# Patient Record
Sex: MALE | Race: WHITE | NOT HISPANIC OR LATINO | Employment: FULL TIME | URBAN - METROPOLITAN AREA
[De-identification: names, ages, dates, MRNs, and addresses within clinical notes are randomized per-mention and may not be internally consistent; named-entity substitution may affect disease eponyms.]

---

## 2017-10-04 ENCOUNTER — OFFICE VISIT - HEALTHEAST (OUTPATIENT)
Dept: FAMILY MEDICINE | Facility: CLINIC | Age: 47
End: 2017-10-04

## 2017-10-04 DIAGNOSIS — Z00.00 PHYSICAL EXAM: ICD-10-CM

## 2017-10-04 DIAGNOSIS — Z80.42 FAMILY HISTORY OF PROSTATE CANCER: ICD-10-CM

## 2017-10-04 ASSESSMENT — MIFFLIN-ST. JEOR: SCORE: 1557.52

## 2017-10-05 LAB
CHOLEST SERPL-MCNC: 217 MG/DL
FASTING STATUS PATIENT QL REPORTED: YES
HDLC SERPL-MCNC: 83 MG/DL
LDLC SERPL CALC-MCNC: 123 MG/DL
PSA SERPL-MCNC: 1 NG/ML (ref 0–2.5)
TRIGL SERPL-MCNC: 56 MG/DL

## 2017-10-16 ENCOUNTER — COMMUNICATION - HEALTHEAST (OUTPATIENT)
Dept: FAMILY MEDICINE | Facility: CLINIC | Age: 47
End: 2017-10-16

## 2018-12-12 ENCOUNTER — OFFICE VISIT - HEALTHEAST (OUTPATIENT)
Dept: FAMILY MEDICINE | Facility: CLINIC | Age: 48
End: 2018-12-12

## 2018-12-12 DIAGNOSIS — Z80.42 FAMILY HISTORY OF PROSTATE CANCER: ICD-10-CM

## 2018-12-12 DIAGNOSIS — Z00.00 ENCOUNTER FOR ROUTINE ADULT HEALTH EXAMINATION WITHOUT ABNORMAL FINDINGS: ICD-10-CM

## 2018-12-12 DIAGNOSIS — Z23 NEED FOR IMMUNIZATION AGAINST INFLUENZA: ICD-10-CM

## 2018-12-12 DIAGNOSIS — Z00.00 HEALTHCARE MAINTENANCE: ICD-10-CM

## 2018-12-12 LAB
ALBUMIN SERPL-MCNC: 4 G/DL (ref 3.5–5)
ALP SERPL-CCNC: 57 U/L (ref 45–120)
ALT SERPL W P-5'-P-CCNC: 37 U/L (ref 0–45)
ANION GAP SERPL CALCULATED.3IONS-SCNC: 11 MMOL/L (ref 5–18)
AST SERPL W P-5'-P-CCNC: 26 U/L (ref 0–40)
BILIRUB SERPL-MCNC: 1.3 MG/DL (ref 0–1)
BUN SERPL-MCNC: 16 MG/DL (ref 8–22)
CALCIUM SERPL-MCNC: 10.3 MG/DL (ref 8.5–10.5)
CHLORIDE BLD-SCNC: 103 MMOL/L (ref 98–107)
CHOLEST SERPL-MCNC: 220 MG/DL
CO2 SERPL-SCNC: 25 MMOL/L (ref 22–31)
CREAT SERPL-MCNC: 1.11 MG/DL (ref 0.7–1.3)
ERYTHROCYTE [DISTWIDTH] IN BLOOD BY AUTOMATED COUNT: 11 % (ref 11–14.5)
FASTING STATUS PATIENT QL REPORTED: YES
GFR SERPL CREATININE-BSD FRML MDRD: >60 ML/MIN/1.73M2
GLUCOSE BLD-MCNC: 83 MG/DL (ref 70–125)
HCT VFR BLD AUTO: 41.9 % (ref 40–54)
HDLC SERPL-MCNC: 70 MG/DL
HGB BLD-MCNC: 14.4 G/DL (ref 14–18)
LDLC SERPL CALC-MCNC: 137 MG/DL
MCH RBC QN AUTO: 32 PG (ref 27–34)
MCHC RBC AUTO-ENTMCNC: 34.3 G/DL (ref 32–36)
MCV RBC AUTO: 93 FL (ref 80–100)
PLATELET # BLD AUTO: 364 THOU/UL (ref 140–440)
PMV BLD AUTO: 7.4 FL (ref 7–10)
POTASSIUM BLD-SCNC: 4.4 MMOL/L (ref 3.5–5)
PROT SERPL-MCNC: 7.4 G/DL (ref 6–8)
PSA SERPL-MCNC: 0.8 NG/ML (ref 0–2.5)
RBC # BLD AUTO: 4.5 MILL/UL (ref 4.4–6.2)
SODIUM SERPL-SCNC: 139 MMOL/L (ref 136–145)
TRIGL SERPL-MCNC: 65 MG/DL
WBC: 5.7 THOU/UL (ref 4–11)

## 2018-12-12 ASSESSMENT — MIFFLIN-ST. JEOR: SCORE: 1537.64

## 2018-12-13 LAB — 25(OH)D3 SERPL-MCNC: 24.1 NG/ML (ref 30–80)

## 2018-12-17 ENCOUNTER — COMMUNICATION - HEALTHEAST (OUTPATIENT)
Dept: FAMILY MEDICINE | Facility: CLINIC | Age: 48
End: 2018-12-17

## 2019-02-19 ENCOUNTER — COMMUNICATION - HEALTHEAST (OUTPATIENT)
Dept: FAMILY MEDICINE | Facility: CLINIC | Age: 49
End: 2019-02-19

## 2020-02-06 ENCOUNTER — COMMUNICATION - HEALTHEAST (OUTPATIENT)
Dept: SCHEDULING | Facility: CLINIC | Age: 50
End: 2020-02-06

## 2020-02-07 ENCOUNTER — OFFICE VISIT - HEALTHEAST (OUTPATIENT)
Dept: FAMILY MEDICINE | Facility: CLINIC | Age: 50
End: 2020-02-07

## 2020-02-07 DIAGNOSIS — J01.10 ACUTE NON-RECURRENT FRONTAL SINUSITIS: ICD-10-CM

## 2020-02-07 DIAGNOSIS — Z12.11 SCREEN FOR COLON CANCER: ICD-10-CM

## 2020-02-07 ASSESSMENT — MIFFLIN-ST. JEOR: SCORE: 1565.06

## 2021-05-31 VITALS — HEIGHT: 68 IN | BODY MASS INDEX: 24.59 KG/M2 | WEIGHT: 162.25 LBS

## 2021-06-02 VITALS — HEIGHT: 67 IN | BODY MASS INDEX: 25.11 KG/M2 | WEIGHT: 160 LBS

## 2021-06-04 VITALS
SYSTOLIC BLOOD PRESSURE: 122 MMHG | OXYGEN SATURATION: 98 % | TEMPERATURE: 97.9 F | BODY MASS INDEX: 23.92 KG/M2 | DIASTOLIC BLOOD PRESSURE: 72 MMHG | WEIGHT: 161.5 LBS | HEIGHT: 69 IN | HEART RATE: 77 BPM

## 2021-06-05 NOTE — PROGRESS NOTES
"  Chief Complaint   Patient presents with     Facial Pain     Onset 3-4 weeks ago     Ear Problem     \"plugged\"     Laryngitis     lost voice yesterday         HPI:   Edgar Dunbar is a 49 y.o. male has had facial pressure and pain with ear pressure for the lat 3-4 weeks.  Lots of head congestion with post nasal drainage.  Lost voice yesterday.  No fever.  Sore throat with coughing.  Cough is mild productive.  No .osb.  Fatigued.  No chest pain.  No stomach pain.  No rash.  No dysuria.  No real headache.  No toothache.    No medication tried.    No tobacco.    ROS:  A 10 point comprehensive review of systems was negative except as noted.     Medications:  No current outpatient medications on file prior to visit.     No current facility-administered medications on file prior to visit.          Social History:  Social History     Tobacco Use     Smoking status: Never Smoker     Smokeless tobacco: Never Used     Tobacco comment: Partner smokes outside   Substance Use Topics     Alcohol use: Yes     Comment: 20drinks/wk         Physical Exam:   Vitals:    02/07/20 0945   BP: 122/72   Patient Site: Right Arm   Patient Position: Sitting   Cuff Size: Adult Regular   Pulse: 77   Temp: 97.9  F (36.6  C)   TempSrc: Oral   SpO2: 98%   Weight: 161 lb 8 oz (73.3 kg)   Height: 5' 8.5\" (1.74 m)       GENERAL:   Alert. Oriented.  EYES: Clear  HENT:  Ears: R TM pearly gray. Normal landmarks. L TM pearly gray.  Normal landmarks  Nose: Clear.  Sinuses: Mild bilateral frontal sinus tenderness  Oropharynx:  No erythema. No exudate.  NECK: Supple. No adenopathy.  LUNGS: Clear to ascultation.  No crackles.  No wheezing  HEART: RRR  SKIN:  No rash.         Assessment/Plan:    1. Acute non-recurrent frontal sinusitis  amoxicillin-clavulanate (AUGMENTIN) 500-125 mg per tablet   2. Screen for colon cancer  Ambulatory referral for Colonoscopy      Antibiotic as directed.  Warm moist compresses to face.  Tylenol or ibuprofen as needed for " pain or fever.  Sudafed as needed for congestion.  Afrin nasal spray as needed for congestion, no longer than 3 days.  Saline nasal spray.  Delsym as needed for cough.  Mucinex as needed to thin secretions.  F/U if worsening or not improving.            Ramirez Castellon MD      2/7/2020    The following portions of the patient's history were reviewed and updated as appropriate: allergies, current medications, past family history, past medical history, past social history, past surgical history and problem list.

## 2021-06-05 NOTE — PATIENT INSTRUCTIONS - HE
Antibiotic as directed.  Warm moist compresses to face.  Tylenol or ibuprofen as needed for pain or fever.  Sudafed as needed for congestion.  Afrin nasal spray as needed for congestion, no longer than 3 days.  Saline nasal spray.  Delsym as needed for cough.  Mucinex as needed to thin secretions.  F/U if worsening or not improving.

## 2021-06-05 NOTE — TELEPHONE ENCOUNTER
RN Triage  Cold 3-4 weeks sinus pressure cough  Loosing voice   plugged ears  Pain over eyes  No fever  Phlegm, no color  Fatigue  Warm transfer to  for appointment.     Marian Luna RN  [place] Nurse Advisor    Reason for Disposition    [1] Sinus congestion (pressure, fullness) AND [2] present > 10 days    Earache    Protocols used: SINUS PAIN OR CONGESTION-A-AH

## 2021-06-13 NOTE — PROGRESS NOTES
Assessment:      Healthy male exam.   Degenerative arthritis right knee  Baker's cyst right knee     Plan:       The patient will have routine lab work done today.  He will stay aerobically active.  He will follow a good well-balanced diet.  He wants to just watch and observe the knee pain.  He will take anti-inflammatories.  If pains worsen we will check an MRI scan.     Subjective:      Edgar Dunbar is a 47 y.o. male who presents for an annual exam. The patient reports that there is not domestic violence in his life.  Overall, he is feeling well.  He does get some pain in his right knee.  This comes and goes.  He does work on his feet all day long.  He is in and out of trucks all day.  This will make the knee pain worse.  He will ice the knee at the end of the day and that seems to help.    Healthy Habits:   Regular Exercise: Yes  Sunscreen Use: Yes  Healthy Diet: Yes  Dental Visits Regularly: Yes  Seat Belt: Yes  Sexually active: Yes  Monthly Self Testicular Exams:  No  Hemoccults: No  Flex Sig: No  Colonoscopy: No  Lipid Profile: Yes  Glucose Screen: Yes      Immunization History   Administered Date(s) Administered     DT (pediatric) 08/04/2005     Hep A, historic 10/12/2010, 04/05/2012     Td, historic 08/04/2005     Tdap 12/18/2013     Immunization status: up to date and documented.    No exam data present     No current outpatient prescriptions on file.     No current facility-administered medications for this visit.      No past medical history on file.  No past surgical history on file.  Review of patient's allergies indicates no known allergies.  No family history on file.  Social History     Social History     Marital status: Single     Spouse name: N/A     Number of children: N/A     Years of education: N/A     Occupational History     Not on file.     Social History Main Topics     Smoking status: Never Smoker     Smokeless tobacco: Never Used     Alcohol use Yes      Comment: 20drinks/wk     Drug  "use: No     Sexual activity: Yes     Partners: Female     Other Topics Concern     Not on file     Social History Narrative       Review of Systems  Review of Systems   Constitutional: Negative.  Negative for fatigue and fever.   HENT: Negative.    Eyes: Negative.    Respiratory: Negative.    Cardiovascular: Negative.    Gastrointestinal: Negative.  Negative for constipation and diarrhea.   Endocrine: Negative.    Genitourinary: Negative.    Musculoskeletal:        Complains of right knee pain.   Skin: Negative.    Allergic/Immunologic: Negative.    Neurological: Negative.    Hematological: Negative.    Psychiatric/Behavioral: Negative.              Objective:     Vitals:    10/04/17 1631   BP: 112/70   Pulse: 64   Resp: 16   Temp: 97.9  F (36.6  C)   TempSrc: Oral   Weight: 162 lb 4 oz (73.6 kg)   Height: 5' 7.82\" (1.723 m)     Body mass index is 24.8 kg/(m^2).    Physical  Physical Exam   Constitutional: He is oriented to person, place, and time. He appears well-developed and well-nourished. No distress.   HENT:   Right Ear: External ear normal.   Left Ear: External ear normal.   Mouth/Throat: Oropharynx is clear and moist.   Eyes: Conjunctivae and EOM are normal. Pupils are equal, round, and reactive to light.   Neck: Normal range of motion. Neck supple. No thyromegaly present.   Cardiovascular: Normal rate, regular rhythm and normal heart sounds.    No murmur heard.  Pulmonary/Chest: Effort normal and breath sounds normal. No respiratory distress.   Abdominal: Soft. Bowel sounds are normal. He exhibits no mass. There is no tenderness.   Genitourinary: Penis normal.   Genitourinary Comments: No inguinal hernia present.   Musculoskeletal: Normal range of motion. He exhibits no edema.   There is mild tenderness over the medial joint line of the right knee.  Small effusion is present.  Baker's cyst is present posteriorly.   Neurological: He is alert and oriented to person, place, and time. He has normal reflexes. "   Skin: Skin is warm and dry.   There are seborrheic keratoses noted over the back.  There is a single nevus which is dark in color but uniform in color.  It is very well-defined.  It is flat.  No malignant features noted in this.   Psychiatric: He has a normal mood and affect.

## 2021-06-16 PROBLEM — E78.5 HYPERLIPIDEMIA LDL GOAL <130: Status: ACTIVE | Noted: 2018-12-17

## 2021-06-16 PROBLEM — E55.9 VITAMIN D DEFICIENCY: Status: ACTIVE | Noted: 2018-12-17

## 2021-06-18 NOTE — LETTER
Letter by Reynaldo Roa CMA at      Author: Reynaldo Roa CMA Service: -- Author Type: --    Filed:  Encounter Date: 2/19/2019 Status: (Other)       March 5, 2019    Edgar Dunbar  [address]  [place] [address]      Dear [place],    [facility] staff was able to verify that you have not yet established care with a new healthcare provider.     As a reminder, Dr. Feliz has recently retired from the clinic.     Please contact the [facility], and a member of our clinical staff would be happy to assist you with scheduling an appointment to set up care with a new physician.     Please call [phone], and ask to schedule an establish care appointment with a new provider.     Thank you!

## 2021-06-22 NOTE — PROGRESS NOTES
Assessment:      Healthy male exam.   External hemorrhoid  Sebaceous cyst     Plan:       Routine lab work will be done today.  Patient will be called with abnormalities.  Influenza vaccination will also be updated.  He will try to become more aerobically active outside of his work.  He will continue a good well-balanced diet.  He thinks he follows a good low-cholesterol diet at this point in time.  He does not eat many fast foods.  He is up-to-date on all preventive health screenings.     Subjective:      Edgar Dunbar is a 48 y.o. male who presents for an annual exam. The patient reports that there is not domestic violence in his life.  Overall, he is feeling well.  He awoke this morning with a small area of pain in the rectal area.  This area burned upon wiping after a bowel movement.  He denies blood in the stool.  He denies abdominal pain.  He continues to work full-time.  He works as a .  He stays fairly active with his job but does not have a true exercise program.  He thinks he follows a good diet.  Last year he was able to lower his cholesterol 50 points with diet only.  He does have a family history of prostate cancer.  He would like a PSA drawn today.    Healthy Habits:   Regular Exercise: Yes  Sunscreen Use: Yes  Healthy Diet: Yes  Dental Visits Regularly: Yes  Seat Belt: Yes  Sexually active: Yes  Monthly Self Testicular Exams:  No  Hemoccults: No  Flex Sig: No  Colonoscopy: No  Lipid Profile: Yes  Glucose Screen: Yes      Immunization History   Administered Date(s) Administered     DT (pediatric) 08/04/2005     Hep A, historic 10/12/2010, 04/05/2012     Td,adult,historic,unspecified 08/04/2005     Tdap 12/18/2013     Immunization status: up to date and documented.    No exam data present     No current outpatient medications on file.     No current facility-administered medications for this visit.      No past medical history on file.  No past surgical history on file.  Patient has no  "known allergies.  No family history on file.  Social History     Socioeconomic History     Marital status: Single     Spouse name: Not on file     Number of children: Not on file     Years of education: Not on file     Highest education level: Not on file   Social Needs     Financial resource strain: Not on file     Food insecurity - worry: Not on file     Food insecurity - inability: Not on file     Transportation needs - medical: Not on file     Transportation needs - non-medical: Not on file   Occupational History     Not on file   Tobacco Use     Smoking status: Never Smoker     Smokeless tobacco: Never Used   Substance and Sexual Activity     Alcohol use: Yes     Comment: 20drinks/wk     Drug use: No     Sexual activity: Yes     Partners: Female   Other Topics Concern     Not on file   Social History Narrative     Not on file       Review of Systems  Review of Systems   Constitutional: Negative.  Negative for fatigue and fever.   HENT: Negative.    Eyes: Negative.    Respiratory: Negative.    Cardiovascular: Negative.    Gastrointestinal:        Complains of mild rectal pain.  This just started today.   Endocrine: Negative.    Genitourinary: Negative.    Musculoskeletal: Negative.    Skin: Negative.    Allergic/Immunologic: Negative.    Neurological: Negative.    Hematological: Negative.    Psychiatric/Behavioral: Negative.              Objective:     Vitals:    12/12/18 1623   BP: 120/70   Pulse: 76   Resp: 14   Temp: 97.6  F (36.4  C)   TempSrc: Oral   SpO2: 98%   Weight: 160 lb (72.6 kg)   Height: 5' 7.21\" (1.707 m)     Body mass index is 24.91 kg/m .    Physical  Physical Exam   Constitutional: He is oriented to person, place, and time. He appears well-developed and well-nourished. No distress.   HENT:   Right Ear: External ear normal.   Left Ear: External ear normal.   Mouth/Throat: Oropharynx is clear and moist.   Eyes: Conjunctivae and EOM are normal. Pupils are equal, round, and reactive to light. "   Neck: Normal range of motion. Neck supple. No JVD present. No thyromegaly present.   Cardiovascular: Normal rate, regular rhythm and normal heart sounds.   No murmur heard.  Pulmonary/Chest: Effort normal and breath sounds normal. No respiratory distress.   Abdominal: Soft. Bowel sounds are normal. He exhibits no mass. There is no tenderness.   Genitourinary: Prostate normal and penis normal.   Genitourinary Comments: No inguinal hernia noted.  Small external hemorrhoid noted in rectal area.   Musculoskeletal: Normal range of motion. He exhibits no edema or tenderness.   Lymphadenopathy:     He has no cervical adenopathy.   Neurological: He is alert and oriented to person, place, and time. He has normal reflexes. No cranial nerve deficit.   Skin: Skin is warm and dry. No rash noted.   There is a 3 x 3 cm sebaceous cyst noted over the right subscapular area.  This area is nontender.   Psychiatric: He has a normal mood and affect.

## 2021-06-24 NOTE — TELEPHONE ENCOUNTER
CMT left message x 1 for patient to return call. Please review message thread below and advise the patient when they return our clinical call. Please schedule if indicated in message. [facility] will attempted to reach the patient x 3 per clinical protocol before closing the encounter. [facility] will send letter to prompt patient follow-up.

## 2021-06-24 NOTE — TELEPHONE ENCOUNTER
CMT left message x 2. Please review message thread below and advise the patient as indicated. Please schedule if necessary or indicated in message thread.  CMT will attempt to reach the patient x 3 per clinical protocol before sending a letter to prompt patient follow up.

## 2021-06-24 NOTE — TELEPHONE ENCOUNTER
The patient is due to establish care with a new primary care physician. [facility] intends to contact the patient and offer an appointment with a provider at [place]. If the patient has already established care elsewhere, staff should remove [facility] from the PCP listing.

## 2021-06-26 ENCOUNTER — HEALTH MAINTENANCE LETTER (OUTPATIENT)
Age: 51
End: 2021-06-26

## 2021-10-16 ENCOUNTER — HEALTH MAINTENANCE LETTER (OUTPATIENT)
Age: 51
End: 2021-10-16

## 2022-07-17 ENCOUNTER — HEALTH MAINTENANCE LETTER (OUTPATIENT)
Age: 52
End: 2022-07-17

## 2022-09-25 ENCOUNTER — HEALTH MAINTENANCE LETTER (OUTPATIENT)
Age: 52
End: 2022-09-25

## 2023-08-05 ENCOUNTER — HEALTH MAINTENANCE LETTER (OUTPATIENT)
Age: 53
End: 2023-08-05

## 2023-10-02 ENCOUNTER — OFFICE VISIT (OUTPATIENT)
Dept: FAMILY MEDICINE | Facility: CLINIC | Age: 53
End: 2023-10-02
Payer: COMMERCIAL

## 2023-10-02 VITALS
DIASTOLIC BLOOD PRESSURE: 83 MMHG | TEMPERATURE: 97.8 F | WEIGHT: 158.6 LBS | HEIGHT: 68 IN | HEART RATE: 83 BPM | BODY MASS INDEX: 24.04 KG/M2 | RESPIRATION RATE: 11 BRPM | SYSTOLIC BLOOD PRESSURE: 134 MMHG | OXYGEN SATURATION: 98 %

## 2023-10-02 DIAGNOSIS — Z11.59 NEED FOR HEPATITIS C SCREENING TEST: ICD-10-CM

## 2023-10-02 DIAGNOSIS — J34.2 DEVIATED NASAL SEPTUM: Primary | ICD-10-CM

## 2023-10-02 DIAGNOSIS — F41.1 GAD (GENERALIZED ANXIETY DISORDER): ICD-10-CM

## 2023-10-02 DIAGNOSIS — Z12.5 SCREENING FOR PROSTATE CANCER: ICD-10-CM

## 2023-10-02 DIAGNOSIS — F41.0 PANIC ATTACK: ICD-10-CM

## 2023-10-02 DIAGNOSIS — Z12.11 SCREEN FOR COLON CANCER: ICD-10-CM

## 2023-10-02 DIAGNOSIS — D17.30 LIPOMA OF SKIN AND SUBCUTANEOUS TISSUE: ICD-10-CM

## 2023-10-02 DIAGNOSIS — Z11.4 SCREENING FOR HIV (HUMAN IMMUNODEFICIENCY VIRUS): ICD-10-CM

## 2023-10-02 DIAGNOSIS — Z00.00 ANNUAL PHYSICAL EXAM: ICD-10-CM

## 2023-10-02 PROBLEM — E55.9 VITAMIN D DEFICIENCY: Status: RESOLVED | Noted: 2018-12-17 | Resolved: 2023-10-02

## 2023-10-02 PROBLEM — E78.5 HYPERLIPIDEMIA LDL GOAL <130: Status: RESOLVED | Noted: 2018-12-17 | Resolved: 2023-10-02

## 2023-10-02 LAB
ALBUMIN SERPL BCG-MCNC: 4.6 G/DL (ref 3.5–5.2)
ALP SERPL-CCNC: 59 U/L (ref 40–129)
ALT SERPL W P-5'-P-CCNC: 46 U/L (ref 0–70)
ANION GAP SERPL CALCULATED.3IONS-SCNC: 11 MMOL/L (ref 7–15)
AST SERPL W P-5'-P-CCNC: 44 U/L (ref 0–45)
BILIRUB SERPL-MCNC: 0.6 MG/DL
BUN SERPL-MCNC: 14.2 MG/DL (ref 6–20)
CALCIUM SERPL-MCNC: 9.9 MG/DL (ref 8.6–10)
CHLORIDE SERPL-SCNC: 103 MMOL/L (ref 98–107)
CHOLEST SERPL-MCNC: 238 MG/DL
CREAT SERPL-MCNC: 1.05 MG/DL (ref 0.67–1.17)
DEPRECATED HCO3 PLAS-SCNC: 25 MMOL/L (ref 22–29)
EGFRCR SERPLBLD CKD-EPI 2021: 84 ML/MIN/1.73M2
ERYTHROCYTE [DISTWIDTH] IN BLOOD BY AUTOMATED COUNT: 12.3 % (ref 10–15)
GLUCOSE SERPL-MCNC: 114 MG/DL (ref 70–99)
HCT VFR BLD AUTO: 42.5 % (ref 40–53)
HCV AB SERPL QL IA: NONREACTIVE
HDLC SERPL-MCNC: 82 MG/DL
HGB BLD-MCNC: 14.6 G/DL (ref 13.3–17.7)
HIV 1+2 AB+HIV1 P24 AG SERPL QL IA: NONREACTIVE
LDLC SERPL CALC-MCNC: 150 MG/DL
MCH RBC QN AUTO: 31.9 PG (ref 26.5–33)
MCHC RBC AUTO-ENTMCNC: 34.4 G/DL (ref 31.5–36.5)
MCV RBC AUTO: 93 FL (ref 78–100)
NONHDLC SERPL-MCNC: 156 MG/DL
PLATELET # BLD AUTO: 363 10E3/UL (ref 150–450)
POTASSIUM SERPL-SCNC: 5.1 MMOL/L (ref 3.4–5.3)
PROT SERPL-MCNC: 8.1 G/DL (ref 6.4–8.3)
PSA SERPL DL<=0.01 NG/ML-MCNC: 1.46 NG/ML (ref 0–3.5)
RBC # BLD AUTO: 4.57 10E6/UL (ref 4.4–5.9)
SODIUM SERPL-SCNC: 139 MMOL/L (ref 135–145)
TRIGL SERPL-MCNC: 29 MG/DL
WBC # BLD AUTO: 6.3 10E3/UL (ref 4–11)

## 2023-10-02 PROCEDURE — 36415 COLL VENOUS BLD VENIPUNCTURE: CPT | Performed by: STUDENT IN AN ORGANIZED HEALTH CARE EDUCATION/TRAINING PROGRAM

## 2023-10-02 PROCEDURE — 80053 COMPREHEN METABOLIC PANEL: CPT | Performed by: STUDENT IN AN ORGANIZED HEALTH CARE EDUCATION/TRAINING PROGRAM

## 2023-10-02 PROCEDURE — 85027 COMPLETE CBC AUTOMATED: CPT | Performed by: STUDENT IN AN ORGANIZED HEALTH CARE EDUCATION/TRAINING PROGRAM

## 2023-10-02 PROCEDURE — 86803 HEPATITIS C AB TEST: CPT | Performed by: STUDENT IN AN ORGANIZED HEALTH CARE EDUCATION/TRAINING PROGRAM

## 2023-10-02 PROCEDURE — 90471 IMMUNIZATION ADMIN: CPT | Performed by: STUDENT IN AN ORGANIZED HEALTH CARE EDUCATION/TRAINING PROGRAM

## 2023-10-02 PROCEDURE — 80061 LIPID PANEL: CPT | Performed by: STUDENT IN AN ORGANIZED HEALTH CARE EDUCATION/TRAINING PROGRAM

## 2023-10-02 PROCEDURE — 99386 PREV VISIT NEW AGE 40-64: CPT | Mod: 25 | Performed by: STUDENT IN AN ORGANIZED HEALTH CARE EDUCATION/TRAINING PROGRAM

## 2023-10-02 PROCEDURE — 87389 HIV-1 AG W/HIV-1&-2 AB AG IA: CPT | Performed by: STUDENT IN AN ORGANIZED HEALTH CARE EDUCATION/TRAINING PROGRAM

## 2023-10-02 PROCEDURE — 99214 OFFICE O/P EST MOD 30 MIN: CPT | Mod: 25 | Performed by: STUDENT IN AN ORGANIZED HEALTH CARE EDUCATION/TRAINING PROGRAM

## 2023-10-02 PROCEDURE — G0103 PSA SCREENING: HCPCS | Performed by: STUDENT IN AN ORGANIZED HEALTH CARE EDUCATION/TRAINING PROGRAM

## 2023-10-02 PROCEDURE — 90682 RIV4 VACC RECOMBINANT DNA IM: CPT | Performed by: STUDENT IN AN ORGANIZED HEALTH CARE EDUCATION/TRAINING PROGRAM

## 2023-10-02 RX ORDER — CITALOPRAM HYDROBROMIDE 10 MG/1
10 TABLET ORAL DAILY
Qty: 30 TABLET | Refills: 1 | Status: SHIPPED | OUTPATIENT
Start: 2023-10-02 | End: 2023-12-11

## 2023-10-02 ASSESSMENT — ENCOUNTER SYMPTOMS
ABDOMINAL PAIN: 0
HEMATOCHEZIA: 0
CHILLS: 0
WEAKNESS: 0
NAUSEA: 0
EYE PAIN: 0
FREQUENCY: 0
ARTHRALGIAS: 0
CONSTIPATION: 0
NERVOUS/ANXIOUS: 1
HEADACHES: 0
HEARTBURN: 0
JOINT SWELLING: 0
PARESTHESIAS: 0
DYSURIA: 0
DIZZINESS: 0
MYALGIAS: 0
COUGH: 0
DIARRHEA: 0
SHORTNESS OF BREATH: 0
HEMATURIA: 0
PALPITATIONS: 0
SORE THROAT: 0

## 2023-10-02 ASSESSMENT — PATIENT HEALTH QUESTIONNAIRE - PHQ9
SUM OF ALL RESPONSES TO PHQ QUESTIONS 1-9: 2
5. POOR APPETITE OR OVEREATING: SEVERAL DAYS

## 2023-10-02 ASSESSMENT — ANXIETY QUESTIONNAIRES
2. NOT BEING ABLE TO STOP OR CONTROL WORRYING: SEVERAL DAYS
5. BEING SO RESTLESS THAT IT IS HARD TO SIT STILL: NOT AT ALL
IF YOU CHECKED OFF ANY PROBLEMS ON THIS QUESTIONNAIRE, HOW DIFFICULT HAVE THESE PROBLEMS MADE IT FOR YOU TO DO YOUR WORK, TAKE CARE OF THINGS AT HOME, OR GET ALONG WITH OTHER PEOPLE: SOMEWHAT DIFFICULT
GAD7 TOTAL SCORE: 6
GAD7 TOTAL SCORE: 6
3. WORRYING TOO MUCH ABOUT DIFFERENT THINGS: SEVERAL DAYS
1. FEELING NERVOUS, ANXIOUS, OR ON EDGE: SEVERAL DAYS
6. BECOMING EASILY ANNOYED OR IRRITABLE: SEVERAL DAYS
7. FEELING AFRAID AS IF SOMETHING AWFUL MIGHT HAPPEN: SEVERAL DAYS

## 2023-10-02 ASSESSMENT — PAIN SCALES - GENERAL: PAINLEVEL: NO PAIN (0)

## 2023-10-02 NOTE — PROGRESS NOTES
Assessment/ Plan   53-year-old male with unremarkable past medical history who presents for establishment of care and annual physical exam.  Also wishes to discuss panic attacks he has been having while driving.  I do think his description of these are consistent with panic attacks with heart racing and tachypnea sometimes associated feeling of dread and numbness and tingling in his face and hands.  He has never tried medications before for anxiety but given his profession as a  cannot be on any potentially sedating medications.  This limits him somewhat so I recommended starting an SSRI daily.  We will follow-up in a month to see how this is going.    1. Deviated nasal septum  - Adult ENT  Referral; Future    2. SHARONDA (generalized anxiety disorder)  3. Panic attack  - citalopram (CELEXA) 10 MG tablet; Take 1 tablet (10 mg) by mouth daily  Dispense: 30 tablet; Refill: 1    4. Screening for prostate cancer  - PSA, screen; Future    5. Annual physical exam  - Lipid panel reflex to direct LDL Fasting; Future  - Comprehensive metabolic panel (BMP + Alb, Alk Phos, ALT, AST, Total. Bili, TP); Future  - CBC with platelets; Future    6. Lipoma of skin and subcutaneous tissue    7. Screening for HIV (human immunodeficiency virus)  - HIV Antigen Antibody Combo; Future    8. Need for hepatitis C screening test  - Hepatitis C antibody; Future    9. Screen for colon cancer  - Colonoscopy Screening  Referral; Future    Follow-up in: 1 month virtual for anxiety    Driss Peralta MD    Subjective:     Edgar Dunbar is a 53 year old male who presents for an annual exam.     Chief Complaint   Patient presents with    Physical     Anxiety - when driving - panic attack      Patient tells me he has been having some anxiety with driving.  When [place] is in [place] he will feel anxious about the cars around him and start getting overwhelmed.  He needs to get to the right leg to feel good.   He feels like these are panic attacks.  He does not have anxiety otherwise.         No data to display                Colonoscopy: never  PSA:  Prostate Specific Antigen Screen   Date Value Ref Range Status   12/12/2018 0.8 0.0 - 2.5 ng/mL Final       Immunization History   Administered Date(s) Administered    COVID-19 MONOVALENT 12+ (Pfizer) 04/26/2021, 05/17/2021    DT (PEDS <7y) 08/04/2005    FLU 6-35 months 10/21/2009    Flu, Unspecified 09/27/2011, 09/18/2019    Hepatitis A (ADULT 19+) 10/12/2010, 04/05/2012    Influenza (IIV3) PF 10/13/2010    Influenza Vaccine, 6+MO IM (QUADRIVALENT W/PRESERVATIVES) 12/12/2018    MMR 08/31/1994    TD,PF 7+ (Tenivac) 08/04/2005    TDAP (Adacel,Boostrix) 12/18/2013    Td (Adult), Adsorbed 08/31/1994    Td,adult,historic,unspecified 08/04/2005     Immunization status: due today.     No current outpatient medications on file.     No past medical history on file.  No past surgical history on file.  Patient has no known allergies.  No family history on file.  Social History     Socioeconomic History    Marital status: Single     Spouse name: Not on file    Number of children: Not on file    Years of education: Not on file    Highest education level: Not on file   Occupational History    Not on file   Tobacco Use    Smoking status: Never    Smokeless tobacco: Never    Tobacco comments:     Partner smokes outside   Substance and Sexual Activity    Alcohol use: Yes     Comment: Alcoholic Drinks/day: 20drinks/wk    Drug use: No    Sexual activity: Yes     Partners: Female   Other Topics Concern    Not on file   Social History Narrative    Not on file     Social Determinants of Health     Financial Resource Strain: Low Risk  (10/2/2023)    Financial Resource Strain     Within the past 12 months, have you or your family members you live with been unable to get utilities (heat, electricity) when it was really needed?: No   Food Insecurity: Low Risk  (10/2/2023)    Food Insecurity      "Within the past 12 months, did you worry that your food would run out before you got money to buy more?: No     Within the past 12 months, did the food you bought just not last and you didn t have money to get more?: No   Transportation Needs: Low Risk  (10/2/2023)    Transportation Needs     Within the past 12 months, has lack of transportation kept you from medical appointments, getting your medicines, non-medical meetings or appointments, work, or from getting things that you need?: No   Physical Activity: Not on file   Stress: Not on file   Social Connections: Not on file   Interpersonal Safety: Low Risk  (10/2/2023)    Interpersonal Safety     Do you feel physically and emotionally safe where you currently live?: Yes     Within the past 12 months, have you been hit, slapped, kicked or otherwise physically hurt by someone?: No     Within the past 12 months, have you been humiliated or emotionally abused in other ways by your partner or ex-partner?: No   Housing Stability: Low Risk  (10/2/2023)    Housing Stability     Do you have housing? : Yes     Are you worried about losing your housing?: No       Review of Systems  Complete ROS negative except as noted in the HPI    Objective:      Vitals:    10/02/23 1003 10/02/23 1008   BP: (!) 148/80 134/83   BP Location: Right arm Right arm   Patient Position: Sitting Sitting   Cuff Size: Adult Large Adult Large   Pulse: 83    Resp: 11    Temp: 97.8  F (36.6  C)    TempSrc: Temporal    SpO2: 98%    Weight: 71.9 kg (158 lb 9.6 oz)    Height: 1.729 m (5' 8.07\")        General appearance: Alert, cooperative, no distress, appears stated age  Head: Normocephalic, atraumatic, without obvious abnormality  EARS: TM's gray dull with structures seen bilaterally  Eyes: Pupils equal round, reactive.  Conjunctiva clear.  Nose: Nares normal, no drainage.  Throat: Lips, mucosa, tongue normal mucosa pink and moist  Neck: Supple, symmetric, trachea midline, no adenopathy.  No thyroid " enlargement, tenderness or nodules.    Lungs: Clear to auscultation bilaterally, no wheezing or crackles present.  Respirations unlabored  Heart: Regular rate and rhythm, normal S1 and S2, no murmur, rub or gallop.  Abdomen: Soft, nontender, nondistended.  Bowel sounds active in all 4 quadrants.  No masses or organomegaly.  Extremities: Extremities normal, atraumatic.  No cyanosis or edema.  Skin: Skin color, texture, turgor normal no rashes or lesions on limited skin exam  Neurologic: CN II through XII intact, normal strength.  Psych: Normal-appearing hygiene, speech is normal pace and volume, nontangential, noncircumferential, thoughtprocess is logical, does not appear to responding to internal stimuli, no expression of paranoid delusions, mood is mildly anxious      Driss Johnson MD

## 2023-10-03 NOTE — RESULT ENCOUNTER NOTE
Red,  Your results from your recent clinic visit show:  Your lipids look ok and I used these as well as other factors from your history to calculate your 10 year risk of having something like a heart attack (ASCVD risk) and it was low risk. Just continue to work on exercise and diet to maintain this low risk.    The 10-year ASCVD risk score (Cheryl BAL, et al., 2019) is: 4.3%    Values used to calculate the score:      Age: 53 years      Sex: Male      Is Non- : No      Diabetic: No      Tobacco smoker: No      Systolic Blood Pressure: 134 mmHg      Is BP treated: No      HDL Cholesterol: 82 mg/dL      Total Cholesterol: 238 mg/dL    Your CMP was normal with normal electrolytes, kidney function, and liver function  Your HIV is normal or negative  Your PSA, which is a screen for prostate cancer, was normal  Your Hep c screen is normal or negative  Your CBC is normal with no anemia or signs of infection seen    If you have more questions please call the clinic at [phone] or send me a [facility] message    Dr. Driss Peralta

## 2023-11-08 ASSESSMENT — ANXIETY QUESTIONNAIRES
7. FEELING AFRAID AS IF SOMETHING AWFUL MIGHT HAPPEN: NOT AT ALL
5. BEING SO RESTLESS THAT IT IS HARD TO SIT STILL: NOT AT ALL
GAD7 TOTAL SCORE: 0
2. NOT BEING ABLE TO STOP OR CONTROL WORRYING: NOT AT ALL
GAD7 TOTAL SCORE: 0
4. TROUBLE RELAXING: NOT AT ALL
6. BECOMING EASILY ANNOYED OR IRRITABLE: NOT AT ALL
3. WORRYING TOO MUCH ABOUT DIFFERENT THINGS: NOT AT ALL
1. FEELING NERVOUS, ANXIOUS, OR ON EDGE: NOT AT ALL

## 2023-11-09 ENCOUNTER — VIRTUAL VISIT (OUTPATIENT)
Dept: FAMILY MEDICINE | Facility: CLINIC | Age: 53
End: 2023-11-09
Payer: COMMERCIAL

## 2023-11-09 DIAGNOSIS — F41.1 GAD (GENERALIZED ANXIETY DISORDER): Primary | ICD-10-CM

## 2023-11-09 PROCEDURE — 99213 OFFICE O/P EST LOW 20 MIN: CPT | Mod: 95 | Performed by: STUDENT IN AN ORGANIZED HEALTH CARE EDUCATION/TRAINING PROGRAM

## 2023-11-09 PROCEDURE — 96127 BRIEF EMOTIONAL/BEHAV ASSMT: CPT | Performed by: STUDENT IN AN ORGANIZED HEALTH CARE EDUCATION/TRAINING PROGRAM

## 2023-11-09 RX ORDER — CITALOPRAM HYDROBROMIDE 10 MG/1
10 TABLET ORAL DAILY
Qty: 90 TABLET | Refills: 3 | Status: SHIPPED | OUTPATIENT
Start: 2023-11-09 | End: 2024-09-18

## 2023-11-09 NOTE — PROGRESS NOTES
Assessment & Plan   53-year-old male with recent diagnosis of generalized anxiety disorder and panic attacks.  We started patient on citalopram 10 mg a month ago.  Patient is doing well on this with no panic attacks since starting and significantly less baseline anxiety.  He would like to continue in the long-term and I agree.    1. SHARONDA (generalized anxiety disorder)  - citalopram (CELEXA) 10 MG tablet; Take 1 tablet (10 mg) by mouth daily  Dispense: 90 tablet; Refill: 3      Subjective   Chief Complaint   Patient presents with    Follow Up     Medication        HPI     Depression/SHARONDA HX:  Patient tells me he has been having some anxiety with driving.  [place] in [place] he will feel anxious about the cars around him and start getting overwhelmed.  He needs to get to the right leg to feel good.  He feels like these are panic attacks.  He does not have anxiety otherwise.     11/23:  Patient tells me that after a about 3 weeks he started noticing less panic attacks and hasn't had any.  Less baseline anxiety then he felt before. He had luciana nausea at first but this improved. No others.     Current Treatment:  Citalopram 10 mg daily        10/2/2023     2:32 PM   PHQ   PHQ-9 Total Score 2   Q9: Thoughts of better off dead/self-harm past 2 weeks Not at all           10/2/2023     2:32 PM 11/8/2023     6:26 PM   SHARONDA-7 SCORE   Total Score  0 (minimal anxiety)   Total Score 6 0     Objective      Vitals:  No vitals were obtained today due to virtual visit.    Physical Exam   GENERAL: Healthy, alert and no distress  EYES: Eyes grossly normal to inspection.  No discharge or erythema, or obvious scleral/conjunctival abnormalities.  RESP: No audible wheeze, cough, or visible cyanosis.  No visible retractions or increased work of breathing.    SKIN: Visible skin clear. No significant rash, abnormal pigmentation or lesions.  NEURO: Cranial nerves grossly intact.  Mentation and speech appropriate for  age.  PSYCH: Mentation appears normal, affect normal/bright, judgement and insight intact, normal speech and appearance well-groomed.    Video-Visit Details    Type of service:  Video Visit     Start time: 5:31 PM  End time: 5:38 PM    Originating Location (pt. Location): Home  Distant Location (provider location):  On-site  Platform used for Video Visit: Erma

## 2023-11-09 NOTE — TELEPHONE ENCOUNTER
FUTURE VISIT INFORMATION      FUTURE VISIT INFORMATION:  Date: 2/21/2024  Time: 12 PM  [place] [facility] [place]  REFERRAL INFORMATION:  Referring provider:    Referring providers clinic:    Reason for visit/diagnosis  Deviated nasal septum, apt per Pt, [place] verified     RECORDS REQUESTED FROM:       Clinic name Comments Records Status Imaging Status   [facility] Medicine 10/2/23 referral/ OV notes- Driss Johnson MD Epic    [facility]    2/7/2020 OV with Ramirez Castellon MD Epic

## 2023-11-28 DIAGNOSIS — F41.0 PANIC ATTACK: ICD-10-CM

## 2023-11-28 DIAGNOSIS — F41.1 GAD (GENERALIZED ANXIETY DISORDER): ICD-10-CM

## 2023-11-28 RX ORDER — CITALOPRAM HYDROBROMIDE 10 MG/1
10 TABLET ORAL DAILY
Qty: 30 TABLET | Refills: 1 | OUTPATIENT
Start: 2023-11-28

## 2023-11-28 NOTE — TELEPHONE ENCOUNTER
Pending Prescriptions:                       Disp   Refills    citalopram (CELEXA) 10 MG tablet          30 tab*1            Sig: Take 1 tablet (10 mg) by mouth daily

## 2023-12-11 DIAGNOSIS — F41.1 GAD (GENERALIZED ANXIETY DISORDER): ICD-10-CM

## 2023-12-11 DIAGNOSIS — F41.0 PANIC ATTACK: ICD-10-CM

## 2023-12-11 RX ORDER — CITALOPRAM HYDROBROMIDE 10 MG/1
10 TABLET ORAL DAILY
Qty: 90 TABLET | Refills: 1 | Status: SHIPPED | OUTPATIENT
Start: 2023-12-11 | End: 2024-04-24

## 2024-01-08 ENCOUNTER — TRANSFERRED RECORDS (OUTPATIENT)
Dept: HEALTH INFORMATION MANAGEMENT | Facility: CLINIC | Age: 54
End: 2024-01-08
Payer: COMMERCIAL

## 2024-01-12 PROBLEM — Z00.00 HEALTHCARE MAINTENANCE: Status: ACTIVE | Noted: 2023-10-03

## 2024-02-21 ENCOUNTER — OFFICE VISIT (OUTPATIENT)
Dept: OTOLARYNGOLOGY | Facility: CLINIC | Age: 54
End: 2024-02-21
Attending: STUDENT IN AN ORGANIZED HEALTH CARE EDUCATION/TRAINING PROGRAM
Payer: COMMERCIAL

## 2024-02-21 ENCOUNTER — TELEPHONE (OUTPATIENT)
Dept: OTOLARYNGOLOGY | Facility: CLINIC | Age: 54
End: 2024-02-21

## 2024-02-21 ENCOUNTER — PRE VISIT (OUTPATIENT)
Dept: OTOLARYNGOLOGY | Facility: CLINIC | Age: 54
End: 2024-02-21

## 2024-02-21 VITALS — HEIGHT: 68 IN | WEIGHT: 162 LBS | BODY MASS INDEX: 24.55 KG/M2

## 2024-02-21 DIAGNOSIS — J34.2 DEVIATED NASAL SEPTUM: ICD-10-CM

## 2024-02-21 PROCEDURE — 99203 OFFICE O/P NEW LOW 30 MIN: CPT | Performed by: PHYSICIAN ASSISTANT

## 2024-02-21 ASSESSMENT — PAIN SCALES - GENERAL: PAINLEVEL: NO PAIN (0)

## 2024-02-21 NOTE — LETTER
"2/21/2024       RE: Edgar Dunbar  [address] [place] [address]     Dear Colleague,    Thank you for referring your patient, Edgar Dunbar, to the [facility] E[facility] NOSE [facility] TH[facility]AT [facility]IC [place] [facility]. Please see a copy of my visit note below.      Otolaryngology Clinic  February 21, 2024    Chief Complaint:   Deviated septum causing issues breathing through left nostril       History of Present Illness:   Red is a 54yo male presenting to clinic today for evaluation of a deviated nasal septum. He reports having issues breathing out of his left nostril for the last 10 years or so. 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 At night, he is unable to sleep on his right side d/t not being able to adequately breathe out of his left nostril. He has been using zicam spray for decongestant at night with little to no symptomatic relief.    Past Medical History:  No past medical history on file.    Past Surgical History:  No past surgical history on file.    Medications:  Current Outpatient Medications   Medication    citalopram (CELEXA) 10 MG tablet    citalopram (CELEXA) 10 MG tablet     No current facility-administered medications for this visit.     Allergies:   No Known Allergies    Social History:  Social History     Tobacco Use    Smoking status: Never    Smokeless tobacco: Never    Tobacco comments:     Partner smokes outside   Substance Use Topics    Alcohol use: Yes     Comment: Alcoholic Drinks/day: 20drinks/wk    Drug use: No       ROS: 10 point ROS neg other than the symptoms noted above in the HPI.    Physical Exam:    Ht 1.727 m (5' 8\")   Wt 73.5 kg (162 lb)   BMI 24.63 kg/m      Constitutional:  The patient was unaccompanied, well-groomed, and in no acute distress.     Skin: Normal:  warm and pink without rash    Neurologic: Alert and oriented x 3.  " CN's III-XII within normal limits.  Voice normal.    Psychiatric: The patient's affect was calm, cooperative, and appropriate.     Communication:  Normal; communicates verbally, normal voice quality.    Respiratory: Breathing comfortably without stridor or exertion of accessory muscles.    Head/Face:  Normocephalic and atraumatic.  No lesions or scars.   Salivary glands -  Normal size, no tenderness, swelling, or palpable masses. Nose is visually convexly deviated to his left. Nasal valve collapse of right nostril upon inspiration.   Eyes: Pupils were equal and reactive.  Extraocular movement intact.    Ears: Pinnae and tragus non-tender.  EAC's and TM's were clear.     Nose: Sinuses were non-tender.  Anterior rhinoscopy revealed left convexly deviated septum that appeared to be isolated to the cartilage and not extended to nasal bone. No purulence or polyps.     Oral Cavity: Normal tongue, floor of mouth, buccal mucosa, and palate.  No lesions or masses on inspection or palpation.     Oropharynx: Normal mucosa, palate symmetric with normal elevation. No abnormal lymph tissue in the oropharynx.  Pterygoid region non-tender.    Neck: Supple with normal laryngeal and tracheal landmarks. Normal range of motion.       Assessment and Plan:  Red is a 54yo male w/ 10+ year hx of trouble breathing d/t deviated septum. Upon visualization of patient's face, his nose is convexly deviated to the left. Nasal valve collapse of the right nostril upon inspiration. Rhinoscopy of left side revealed significant deviation of nasal septal cartilage however posterior nasal passage visualized. Red expressed interested in moving forward with getting images and following up with a facial plastic surgeon regarding his deviated septum. We will plan to get a CT facial bones and schedule facial plastics appointment.    Note written by JOSSUE Benton-student    ANAHI RollinsC  Otolaryngology  Head & Neck  Surgery  [phone]      Documented time does not include time spent on above procedure.

## 2024-02-21 NOTE — PROGRESS NOTES
"  Otolaryngology Clinic  February 21, 2024    Chief Complaint:   Deviated septum causing issues breathing through left nostril       History of Present Illness:   Red is a 54yo male presenting to clinic today for evaluation of a deviated nasal septum. He reports having issues breathing out of his left nostril for the last 10 years or so. 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 At night, he is unable to sleep on his right side d/t not being able to adequately breathe out of his left nostril. He has been using zicam spray for decongestant at night with little to no symptomatic relief.    Past Medical History:  No past medical history on file.    Past Surgical History:  No past surgical history on file.    Medications:  Current Outpatient Medications   Medication    citalopram (CELEXA) 10 MG tablet    citalopram (CELEXA) 10 MG tablet     No current facility-administered medications for this visit.     Allergies:   No Known Allergies    Social History:  Social History     Tobacco Use    Smoking status: Never    Smokeless tobacco: Never    Tobacco comments:     Partner smokes outside   Substance Use Topics    Alcohol use: Yes     Comment: Alcoholic Drinks/day: 20drinks/wk    Drug use: No       ROS: 10 point ROS neg other than the symptoms noted above in the HPI.    Physical Exam:    Ht 1.727 m (5' 8\")   Wt 73.5 kg (162 lb)   BMI 24.63 kg/m      Constitutional:  The patient was unaccompanied, well-groomed, and in no acute distress.     Skin: Normal:  warm and pink without rash    Neurologic: Alert and oriented x 3.  CN's III-XII within normal limits.  Voice normal.    Psychiatric: The patient's affect was calm, cooperative, and appropriate.     Communication:  Normal; communicates verbally, normal voice quality.    Respiratory: Breathing comfortably without stridor or exertion of accessory muscles.    Head/Face:  Normocephalic and " atraumatic.  No lesions or scars.   Salivary glands -  Normal size, no tenderness, swelling, or palpable masses. Nose is visually convexly deviated to his left. Nasal valve collapse of right nostril upon inspiration.   Eyes: Pupils were equal and reactive.  Extraocular movement intact.    Ears: Pinnae and tragus non-tender.  EAC's and TM's were clear.     Nose: Sinuses were non-tender.  Anterior rhinoscopy revealed left convexly deviated septum that appeared to be isolated to the cartilage and not extended to nasal bone. No purulence or polyps.     Oral Cavity: Normal tongue, floor of mouth, buccal mucosa, and palate.  No lesions or masses on inspection or palpation.     Oropharynx: Normal mucosa, palate symmetric with normal elevation. No abnormal lymph tissue in the oropharynx.  Pterygoid region non-tender.    Neck: Supple with normal laryngeal and tracheal landmarks. Normal range of motion.       Assessment and Plan:  Red is a 54yo male w/ 10+ year hx of trouble breathing d/t deviated septum. Upon visualization of patient's face, his nose is convexly deviated to the left. Nasal valve collapse of the right nostril upon inspiration. Rhinoscopy of left side revealed significant deviation of nasal septal cartilage however posterior nasal passage visualized. Red expressed interested in moving forward with getting images and following up with a facial plastic surgeon regarding his deviated septum. We will plan to get a CT facial bones and schedule facial plastics appointment.    Note written by JOSSUE Benton-student    Maria A Taylor PA-C  Otolaryngology  Head & Neck Surgery  [phone]      Documented time does not include time spent on above procedure.

## 2024-02-22 NOTE — TELEPHONE ENCOUNTER
FUTURE VISIT INFORMATION      FUTURE VISIT INFORMATION:  Date: 4/24/24  Time: 1pm  Location: [facility] [place]  REFERRAL INFORMATION:  Referring provider:  aMria A Taylor PA-C   Referring providers clinic:  [facility] ENT  Reason for visit/diagnosis  per pt, Refd Maria A Taylor, deviated septum and nasal valve collapse, [facility] confd     RECORDS REQUESTED FROM:       Clinic name Comments Records Status Imaging Status   [facility] ENT 2/21/24- OV w. Maria A Taylor PA-C  [facility]  10/2/23 referral/ OV notes- Driss Johnson MD  [facility]  2/7/2020 OV with Ramirez Castellon MD    [facility]     Imaging  2/29/24- CT Facial bone  [facility]  [facility]

## 2024-02-29 ENCOUNTER — ANCILLARY PROCEDURE (OUTPATIENT)
Dept: CT IMAGING | Facility: CLINIC | Age: 54
End: 2024-02-29
Attending: PHYSICIAN ASSISTANT
Payer: COMMERCIAL

## 2024-02-29 DIAGNOSIS — J34.2 DEVIATED NASAL SEPTUM: ICD-10-CM

## 2024-02-29 PROCEDURE — 70486 CT MAXILLOFACIAL W/O DYE: CPT | Mod: GC | Performed by: RADIOLOGY

## 2024-04-24 ENCOUNTER — PRE VISIT (OUTPATIENT)
Dept: OTOLARYNGOLOGY | Facility: CLINIC | Age: 54
End: 2024-04-24

## 2024-04-24 ENCOUNTER — OFFICE VISIT (OUTPATIENT)
Dept: OTOLARYNGOLOGY | Facility: CLINIC | Age: 54
End: 2024-04-24
Payer: COMMERCIAL

## 2024-04-24 DIAGNOSIS — J34.3 HYPERTROPHY OF NASAL TURBINATES: ICD-10-CM

## 2024-04-24 DIAGNOSIS — J34.2 DEVIATED NASAL SEPTUM: Primary | ICD-10-CM

## 2024-04-24 PROCEDURE — 99213 OFFICE O/P EST LOW 20 MIN: CPT | Performed by: OTOLARYNGOLOGY

## 2024-04-24 ASSESSMENT — PAIN SCALES - GENERAL: PAINLEVEL: NO PAIN (0)

## 2024-04-24 NOTE — PROGRESS NOTES
Edgar Dunbar is a 54-year gentleman who was seen previous in our clinic by our PA who was noted to have a nasal septal deviation.  He has mild mucus production but no chronic rhinitis.  He has tried nasal sprays in the past without success.    His past medical history is reviewed as are medications allergy status.    Review of systems is notable for the above only.    Examination shows mild distortion of the nasal dorsum with the underlying septum at the same right left direction.  Is completely occluded the left nares.  Nora maneuver obviously helps that side versus the right.  But turbinates are mildly enlarged.    Assessment: Nasal septal deviation and turbinate hypertrophy.    Plan: Recommend septoplasty turbinate reduction.  Will schedule accordingly.

## 2024-04-24 NOTE — LETTER
4/24/2024       RE: Edgar Dunbar  [address] [place] [address]     Dear Colleague,    Thank you for referring your patient, Edgar Dunbar, to the [facility] EAR NOSE AND THROAT CLINIC [place] [facility]. Please see a copy of my visit note below.    Edgar Dunbar is a 54-year gentleman who was seen previous in our clinic by our PA who was noted to have a nasal septal deviation.  He has mild mucus production but no chronic rhinitis.  He has tried nasal sprays in the past without success.    His past medical history is reviewed as are medications allergy status.    Review of systems is notable for the above only.    Examination shows mild distortion of the nasal dorsum with the underlying septum at the same right left direction.  Is completely occluded the left nares.  Nora maneuver obviously helps that side versus the right.  But turbinates are mildly enlarged.    Assessment: Nasal septal deviation and turbinate hypertrophy.    Plan: Recommend septoplasty turbinate reduction.  Will schedule accordingly.      Again, thank you for allowing me to participate in the care of your patient.      Sincerely,    Remi Boo MD

## 2024-04-26 ENCOUNTER — PREP FOR PROCEDURE (OUTPATIENT)
Dept: OTOLARYNGOLOGY | Facility: CLINIC | Age: 54
End: 2024-04-26
Payer: COMMERCIAL

## 2024-04-26 DIAGNOSIS — J34.2 DEVIATED NASAL SEPTUM: Primary | ICD-10-CM

## 2024-04-26 RX ORDER — DEXAMETHASONE SODIUM PHOSPHATE 4 MG/ML
10 INJECTION, SOLUTION INTRA-ARTICULAR; INTRALESIONAL; INTRAMUSCULAR; INTRAVENOUS; SOFT TISSUE ONCE
Status: CANCELLED | OUTPATIENT
Start: 2024-04-26 | End: 2024-04-26

## 2024-04-26 RX ORDER — CEFAZOLIN SODIUM 2 G/50ML
2 SOLUTION INTRAVENOUS SEE ADMIN INSTRUCTIONS
Status: CANCELLED | OUTPATIENT
Start: 2024-04-26

## 2024-04-26 RX ORDER — CEFAZOLIN SODIUM 2 G/50ML
2 SOLUTION INTRAVENOUS
Status: CANCELLED | OUTPATIENT
Start: 2024-04-26

## 2024-04-30 ENCOUNTER — TELEPHONE (OUTPATIENT)
Dept: OTOLARYNGOLOGY | Facility: CLINIC | Age: 54
End: 2024-04-30
Payer: COMMERCIAL

## 2024-04-30 NOTE — TELEPHONE ENCOUNTER
Left patient a voicemail to schedule surgery for SEPTOPLASTY, NOSE (N/A) REDUCTION, INFERIOR NASAL TURBINATE (Bilateral) with Dr. Boo - Left Surgery Scheduling line for callback [phone]    Maria A Estes on 4/30/2024 at 2:41 PM

## 2024-05-01 PROBLEM — J34.2 DEVIATED NASAL SEPTUM: Status: ACTIVE | Noted: 2024-04-26

## 2024-05-01 NOTE — TELEPHONE ENCOUNTER
Patient is scheduled for surgery with Dr. Boo.     Spoke with: Patient     Date of Surgery: 7/10/2024    Location: [facility] OR     Pre op with Provider: MELISSA     H&P: Patient will schedule pre op with Driss Johnson. Informed patient pre op will need to be completed within 30 days of surgery date.     Additional imaging/appointments: Patient is scheduled for a 1-2 week post op with Dr. Boo on 7/24/24 at 2:45 PM.     Surgery packet: Will mail packet, confirmed with patient address in chart works best. Patient made aware arrival time for surgery will not be listed within packet.      Additional comments: Informed patient a pre op nurse will call 2-5 days prior to surgery to go over further details/give arrival time.         Humaira Pizarro on 5/1/2024 at 10:41 AM

## 2024-06-24 ENCOUNTER — OFFICE VISIT (OUTPATIENT)
Dept: FAMILY MEDICINE | Facility: CLINIC | Age: 54
End: 2024-06-24
Payer: COMMERCIAL

## 2024-06-24 VITALS
SYSTOLIC BLOOD PRESSURE: 121 MMHG | TEMPERATURE: 98.5 F | DIASTOLIC BLOOD PRESSURE: 74 MMHG | WEIGHT: 162.2 LBS | BODY MASS INDEX: 24.66 KG/M2 | RESPIRATION RATE: 14 BRPM | HEART RATE: 81 BPM | OXYGEN SATURATION: 97 %

## 2024-06-24 DIAGNOSIS — J34.2 DEVIATED NASAL SEPTUM: ICD-10-CM

## 2024-06-24 DIAGNOSIS — Z01.818 PREOP GENERAL PHYSICAL EXAM: Primary | ICD-10-CM

## 2024-06-24 PROCEDURE — 99214 OFFICE O/P EST MOD 30 MIN: CPT | Performed by: STUDENT IN AN ORGANIZED HEALTH CARE EDUCATION/TRAINING PROGRAM

## 2024-06-24 ASSESSMENT — PAIN SCALES - GENERAL: PAINLEVEL: NO PAIN (0)

## 2024-06-24 NOTE — PROGRESS NOTES
[facility]  [address]   [facility] [address]  Phone: [phone]  Fax: [phone]  Primary Provider: Lauri Johnson  Pre-op Performing Provider: LAURI JOHNSON          6/21/2024   Surgical Information   What procedure is being done? Deviated septum surgery   Facility or Hospital where procedure/surgery will be performed: [facility]   Who is doing the procedure / surgery? Dr Boo   Date of surgery / procedure: 7-10-24   Time of surgery / procedure: Morning   Where do you plan to recover after surgery? at home with family        Assessment & Plan   54-year-old male with possible history of generalized anxiety disorder who presents for preop evaluation to have deviated septum repair.  He is low risk with no complicating past medical history.  No concerning medication only on citalopram.  Low to intermediate risk procedure.  I do not think he needs any lab testing before this.  He is approved to proceed with the surgery.    1. Preop general physical exam    2. Deviated nasal septum    The proposed surgical procedure is considered INTERMEDIATE risk.    Antiplatelet or Anticoagulation Medication Instructions   - Patient is on no antiplatelet or anticoagulation medications.    Additional Medication Instructions  Hold citalopram day of    RECOMMENDATION:  Approval given to proceed with proposed procedure, without further diagnostic evaluation.    Subjective     HPI related to upcoming procedure: Deviated Septum        6/21/2024     2:10 PM   Preop Questions   Have you ever had a heart attack or stroke? No   Have you ever had surgery on your heart or blood vessels, such as a stent placement, a coronary artery bypass, or surgery on an artery in your head, neck, heart, or legs? No   Do you have chest pain with activity? No   Do you have a history of heart failure? No   Do you currently have a cold, bronchitis or symptoms of other infection? No   Do you have a  cough, shortness of breath, or wheezing? No   Do you or anyone in your family have previous history of blood clots? No   Do you or does anyone in your family have a serious bleeding problem such as prolonged bleeding following surgeries or cuts? No   Have you ever had problems with anemia or been told to take iron pills? No   Have you had any abnormal blood loss such as black, tarry or bloody stools? No   Have you ever had a blood transfusion? No   Are you willing to have a blood transfusion if it is medically needed before, during, or after your surgery? Yes   Have you or any of your relatives ever had problems with anesthesia? No   Do you have sleep apnea, excessive snoring or daytime drowsiness? No   Do you have any artifical heart valves or other implanted medical devices like a pacemaker, defibrillator, or continuous glucose monitor? No   Do you have artificial joints? No   Are you allergic to latex? No       METS >4? YES    Greater than 65? No  Frailty score: https://www.bgs.org.uk/sites/default/files/content/attachment/[email or web address]/efs.pdf   (Increases risk of MACE, Falls, delirium, malnourishment, length of Stay)  If positive consider PT and if BMI<20 consider albumin if <3 4 weeks high protein diet with supplemental protein    Health Care Directive:  Patient does not have a Health Care Directive or Living Will: Full COde    Preoperative Review of :   reviewed - no record of controlled substances prescribed.    Status of Chronic Conditions:  See problem list for active medical problems.  Problems all longstanding and stable, except as noted/documented.  See ROS for pertinent symptoms related to these conditions.    Review of Systems  Complete ROS is negative except as noted in HPI    Patient Active Problem List    Diagnosis Date Noted    Deviated nasal septum 04/26/2024     Priority: Medium    Healthcare maintenance 10/03/2023     Priority: Medium     Colonoscopy 1/24 repeat in 10 years    9/23:  The  10-year ASCVD risk score (Cheryl BAL, et al., 2019) is: 4.3%    Values used to calculate the score:      Age: 53 years      Sex: Male      Is Non- : No      Diabetic: No      Tobacco smoker: No      Systolic Blood Pressure: 134 mmHg      Is BP treated: No      HDL Cholesterol: 82 mg/dL      Total Cholesterol: 238 mg/dL      SHARONDA (generalized anxiety disorder) 10/02/2023     Priority: Medium     Depression/SHARONDA HX:  Patient tells me he has been having some anxiety with driving.  When he is in the [place] he will feel anxious about the cars around him and start getting overwhelmed.  He needs to get to the right leg to feel good.  He feels like these are panic attacks.  He does not have anxiety otherwise.     11/23:  Patient tells me that after a about 3 weeks he started noticing less panic attacks and hasn't had any.  Less baseline anxiety then he felt before. He had luciana nausea at first but this improved. No others.  We decided to continue him on citalopram in the long term.    Current Treatment:  Citalopram 10 mg daily        10/2/2023     2:32 PM   PHQ   PHQ-9 Total Score 2   Q9: Thoughts of better off dead/self-harm past 2 weeks Not at all           10/2/2023     2:32 PM 11/8/2023     6:26 PM   SHARONDA-7 SCORE   Total Score  0 (minimal anxiety)   Total Score 6 0               Panic attack 10/02/2023     Priority: Medium    Lipoma of skin and subcutaneous tissue      Priority: Medium         No past surgical history on file.    Current Outpatient Medications   Medication Sig Dispense Refill    citalopram (CELEXA) 10 MG tablet Take 1 tablet (10 mg) by mouth daily 90 tablet 3     No current facility-administered medications for this visit.          Allergies   Allergen Reactions    Seasonal Allergies         Social History     Socioeconomic History    Marital status: Single     Spouse name: Not on file    Number of children: Not on file    Years of education: Not on file    Highest  education level: Not on file   Occupational History    Not on file   Tobacco Use    Smoking status: Never    Smokeless tobacco: Never    Tobacco comments:     Partner smokes outside   Substance and Sexual Activity    Alcohol use: Yes     Comment: Alcoholic Drinks/day: 20drinks/wk    Drug use: No    Sexual activity: Yes     Partners: Female     Birth control/protection: Post-menopausal   Other Topics Concern    Not on file   Social History Narrative    Not on file     Social Determinants of Health     Financial Resource Strain: Low Risk  (10/2/2023)    Financial Resource Strain     Within the past 12 months, have you or your family members you live with been unable to get utilities (heat, electricity) when it was really needed?: No   Food Insecurity: Low Risk  (10/2/2023)    Food Insecurity     Within the past 12 months, did you worry that your food would run out before you got money to buy more?: No     Within the past 12 months, did the food you bought just not last and you didn t have money to get more?: No   Transportation Needs: Low Risk  (10/2/2023)    Transportation Needs     Within the past 12 months, has lack of transportation kept you from medical appointments, getting your medicines, non-medical meetings or appointments, work, or from getting things that you need?: No   Physical Activity: Not on file   Stress: Not on file   Social Connections: Not on file   Interpersonal Safety: Low Risk  (6/24/2024)    Interpersonal Safety     Do you feel physically and emotionally safe where you currently live?: Yes     Within the past 12 months, have you been hit, slapped, kicked or otherwise physically hurt by someone?: No     Within the past 12 months, have you been humiliated or emotionally abused in other ways by your partner or ex-partner?: No   Housing Stability: Low Risk  (10/2/2023)    Housing Stability     Do you have housing? : Yes     Are you worried about losing your housing?: No       No family history on  file.      Objective   /74 (BP Location: Left arm, Patient Position: Sitting, Cuff Size: Adult Regular)   Pulse 81   Temp 98.5  F (36.9  C) (Temporal)   Resp 14   Wt 73.6 kg (162 lb 3.2 oz)   SpO2 97%   BMI 24.66 kg/m      General appearance: Alert, cooperative, no distress, appears stated age  Head: Normocephalic, atraumatic, without obvious abnormality  Eyes: Pupils equal round, reactive.  Conjunctiva clear.  Nose: Nares normal, no drainage.  Throat: Lips, mucosa, tongue normal mucosa pink and moist  Neck: Supple, symmetric, trachea midline, no adenopathy.  No thyroid enlargement, tenderness or nodules.    Lungs: Clear to auscultation bilaterally, no wheezing or crackles present.  Respirations unlabored  Heart: Regular rate and rhythm, normal S1 and S2, no murmur, rub or gallop.  Extremities: Extremities normal, atraumatic.  No cyanosis or edema.  Skin: Skin color, texture, turgor normal no rashes or lesions on limited skin exam  Neurologic: CN II through XII intact, normal strength.    Diagnostics:  No labs were ordered during this visit.   No EKG required, no history of coronary heart disease, significant arrhythmia, peripheral arterial disease or other structural heart disease.    Revised Cardiac Risk Index (RCRI):  The patient has the following serious cardiovascular risks for perioperative complications:   - No serious cardiac risks = 0 points     RCRI Interpretation: 0 points: Class I (very low risk - 0.4% complication rate)     Signed Electronically by: Driss Johnson MD  Copy of this evaluation report is provided to requesting physician.}

## 2024-07-09 ENCOUNTER — ANESTHESIA EVENT (OUTPATIENT)
Dept: SURGERY | Facility: AMBULATORY SURGERY CENTER | Age: 54
End: 2024-07-09
Payer: COMMERCIAL

## 2024-07-10 ENCOUNTER — ANESTHESIA (OUTPATIENT)
Dept: SURGERY | Facility: AMBULATORY SURGERY CENTER | Age: 54
End: 2024-07-10
Payer: COMMERCIAL

## 2024-07-10 ENCOUNTER — HOSPITAL ENCOUNTER (OUTPATIENT)
Facility: AMBULATORY SURGERY CENTER | Age: 54
Discharge: HOME | End: 2024-07-10
Attending: OTOLARYNGOLOGY
Payer: COMMERCIAL

## 2024-07-10 VITALS
HEIGHT: 68 IN | OXYGEN SATURATION: 97 % | HEART RATE: 87 BPM | WEIGHT: 162 LBS | DIASTOLIC BLOOD PRESSURE: 93 MMHG | RESPIRATION RATE: 18 BRPM | BODY MASS INDEX: 24.55 KG/M2 | TEMPERATURE: 98 F | SYSTOLIC BLOOD PRESSURE: 142 MMHG

## 2024-07-10 DIAGNOSIS — J34.2 DEVIATED NASAL SEPTUM: Primary | ICD-10-CM

## 2024-07-10 PROCEDURE — 30520 REPAIR OF NASAL SEPTUM: CPT | Performed by: NURSE ANESTHETIST, CERTIFIED REGISTERED

## 2024-07-10 PROCEDURE — 30520 REPAIR OF NASAL SEPTUM: CPT | Performed by: ANESTHESIOLOGY

## 2024-07-10 PROCEDURE — 30520 REPAIR OF NASAL SEPTUM: CPT

## 2024-07-10 PROCEDURE — 30520 REPAIR OF NASAL SEPTUM: CPT | Performed by: OTOLARYNGOLOGY

## 2024-07-10 PROCEDURE — 30801 ABLATE INF TURBINATE SUPERF: CPT | Mod: 59 | Performed by: OTOLARYNGOLOGY

## 2024-07-10 PROCEDURE — 30801 ABLATE INF TURBINATE SUPERF: CPT | Mod: 59

## 2024-07-10 RX ORDER — HYDROMORPHONE HYDROCHLORIDE 1 MG/ML
0.4 INJECTION, SOLUTION INTRAMUSCULAR; INTRAVENOUS; SUBCUTANEOUS EVERY 5 MIN PRN
Status: DISCONTINUED | OUTPATIENT
Start: 2024-07-10 | End: 2024-07-10 | Stop reason: HOSPADM

## 2024-07-10 RX ORDER — PROPOFOL 10 MG/ML
INJECTION, EMULSION INTRAVENOUS CONTINUOUS PRN
Status: DISCONTINUED | OUTPATIENT
Start: 2024-07-10 | End: 2024-07-10

## 2024-07-10 RX ORDER — LABETALOL HYDROCHLORIDE 5 MG/ML
10 INJECTION, SOLUTION INTRAVENOUS
Status: DISCONTINUED | OUTPATIENT
Start: 2024-07-10 | End: 2024-07-10 | Stop reason: HOSPADM

## 2024-07-10 RX ORDER — LIDOCAINE HYDROCHLORIDE AND EPINEPHRINE 10; 10 MG/ML; UG/ML
INJECTION, SOLUTION INFILTRATION; PERINEURAL PRN
Status: DISCONTINUED | OUTPATIENT
Start: 2024-07-10 | End: 2024-07-10 | Stop reason: HOSPADM

## 2024-07-10 RX ORDER — PROPOFOL 10 MG/ML
INJECTION, EMULSION INTRAVENOUS PRN
Status: DISCONTINUED | OUTPATIENT
Start: 2024-07-10 | End: 2024-07-10

## 2024-07-10 RX ORDER — HYDROMORPHONE HYDROCHLORIDE 1 MG/ML
0.2 INJECTION, SOLUTION INTRAMUSCULAR; INTRAVENOUS; SUBCUTANEOUS EVERY 5 MIN PRN
Status: DISCONTINUED | OUTPATIENT
Start: 2024-07-10 | End: 2024-07-10 | Stop reason: HOSPADM

## 2024-07-10 RX ORDER — FENTANYL CITRATE 50 UG/ML
25 INJECTION, SOLUTION INTRAMUSCULAR; INTRAVENOUS
Status: DISCONTINUED | OUTPATIENT
Start: 2024-07-10 | End: 2024-07-11 | Stop reason: HOSPADM

## 2024-07-10 RX ORDER — FENTANYL CITRATE 50 UG/ML
INJECTION, SOLUTION INTRAMUSCULAR; INTRAVENOUS PRN
Status: DISCONTINUED | OUTPATIENT
Start: 2024-07-10 | End: 2024-07-10

## 2024-07-10 RX ORDER — SODIUM CHLORIDE, SODIUM LACTATE, POTASSIUM CHLORIDE, CALCIUM CHLORIDE 600; 310; 30; 20 MG/100ML; MG/100ML; MG/100ML; MG/100ML
INJECTION, SOLUTION INTRAVENOUS CONTINUOUS
Status: DISCONTINUED | OUTPATIENT
Start: 2024-07-10 | End: 2024-07-10 | Stop reason: HOSPADM

## 2024-07-10 RX ORDER — NALOXONE HYDROCHLORIDE 0.4 MG/ML
0.1 INJECTION, SOLUTION INTRAMUSCULAR; INTRAVENOUS; SUBCUTANEOUS
Status: DISCONTINUED | OUTPATIENT
Start: 2024-07-10 | End: 2024-07-11 | Stop reason: HOSPADM

## 2024-07-10 RX ORDER — DEXAMETHASONE SODIUM PHOSPHATE 10 MG/ML
4 INJECTION, SOLUTION INTRAMUSCULAR; INTRAVENOUS
Status: DISCONTINUED | OUTPATIENT
Start: 2024-07-10 | End: 2024-07-10 | Stop reason: HOSPADM

## 2024-07-10 RX ORDER — OXYMETAZOLINE HYDROCHLORIDE 0.05 G/100ML
2 SPRAY NASAL 2 TIMES DAILY PRN
Qty: 20 ML | Refills: 0 | Status: SHIPPED | OUTPATIENT
Start: 2024-07-10 | End: 2024-07-13

## 2024-07-10 RX ORDER — CEFAZOLIN SODIUM 2 G/50ML
2 SOLUTION INTRAVENOUS
Status: COMPLETED | OUTPATIENT
Start: 2024-07-10 | End: 2024-07-10

## 2024-07-10 RX ORDER — ONDANSETRON 2 MG/ML
4 INJECTION INTRAMUSCULAR; INTRAVENOUS EVERY 30 MIN PRN
Status: DISCONTINUED | OUTPATIENT
Start: 2024-07-10 | End: 2024-07-11 | Stop reason: HOSPADM

## 2024-07-10 RX ORDER — ALBUTEROL SULFATE 0.83 MG/ML
2.5 SOLUTION RESPIRATORY (INHALATION) EVERY 4 HOURS PRN
Status: DISCONTINUED | OUTPATIENT
Start: 2024-07-10 | End: 2024-07-10 | Stop reason: HOSPADM

## 2024-07-10 RX ORDER — DEXAMETHASONE SODIUM PHOSPHATE 10 MG/ML
10 INJECTION, SOLUTION INTRAMUSCULAR; INTRAVENOUS ONCE
Status: COMPLETED | OUTPATIENT
Start: 2024-07-10 | End: 2024-07-10

## 2024-07-10 RX ORDER — ACETAMINOPHEN 325 MG/1
975 TABLET ORAL ONCE
Status: COMPLETED | OUTPATIENT
Start: 2024-07-10 | End: 2024-07-10

## 2024-07-10 RX ORDER — KETOROLAC TROMETHAMINE 30 MG/ML
15 INJECTION, SOLUTION INTRAMUSCULAR; INTRAVENOUS
Status: DISCONTINUED | OUTPATIENT
Start: 2024-07-10 | End: 2024-07-10 | Stop reason: HOSPADM

## 2024-07-10 RX ORDER — HYDROXYZINE HYDROCHLORIDE 25 MG/1
25 TABLET, FILM COATED ORAL EVERY 6 HOURS PRN
Status: DISCONTINUED | OUTPATIENT
Start: 2024-07-10 | End: 2024-07-10 | Stop reason: HOSPADM

## 2024-07-10 RX ORDER — CEFAZOLIN SODIUM 2 G/50ML
2 SOLUTION INTRAVENOUS SEE ADMIN INSTRUCTIONS
Status: DISCONTINUED | OUTPATIENT
Start: 2024-07-10 | End: 2024-07-10 | Stop reason: HOSPADM

## 2024-07-10 RX ORDER — LIDOCAINE 40 MG/G
CREAM TOPICAL
Status: DISCONTINUED | OUTPATIENT
Start: 2024-07-10 | End: 2024-07-10 | Stop reason: HOSPADM

## 2024-07-10 RX ORDER — DEXAMETHASONE SODIUM PHOSPHATE 10 MG/ML
4 INJECTION, SOLUTION INTRAMUSCULAR; INTRAVENOUS
Status: DISCONTINUED | OUTPATIENT
Start: 2024-07-10 | End: 2024-07-11 | Stop reason: HOSPADM

## 2024-07-10 RX ORDER — ONDANSETRON 4 MG/1
4 TABLET, ORALLY DISINTEGRATING ORAL EVERY 30 MIN PRN
Status: DISCONTINUED | OUTPATIENT
Start: 2024-07-10 | End: 2024-07-11 | Stop reason: HOSPADM

## 2024-07-10 RX ORDER — LIDOCAINE HYDROCHLORIDE 20 MG/ML
INJECTION, SOLUTION INFILTRATION; PERINEURAL PRN
Status: DISCONTINUED | OUTPATIENT
Start: 2024-07-10 | End: 2024-07-10

## 2024-07-10 RX ORDER — DIAZEPAM 10 MG/2ML
2.5 INJECTION, SOLUTION INTRAMUSCULAR; INTRAVENOUS
Status: DISCONTINUED | OUTPATIENT
Start: 2024-07-10 | End: 2024-07-10 | Stop reason: HOSPADM

## 2024-07-10 RX ORDER — NALOXONE HYDROCHLORIDE 0.4 MG/ML
0.1 INJECTION, SOLUTION INTRAMUSCULAR; INTRAVENOUS; SUBCUTANEOUS
Status: DISCONTINUED | OUTPATIENT
Start: 2024-07-10 | End: 2024-07-10 | Stop reason: HOSPADM

## 2024-07-10 RX ORDER — HYDROCODONE BITARTRATE AND ACETAMINOPHEN 5; 325 MG/1; MG/1
1-2 TABLET ORAL EVERY 4 HOURS PRN
Qty: 20 TABLET | Refills: 0 | Status: SHIPPED | OUTPATIENT
Start: 2024-07-10

## 2024-07-10 RX ORDER — ONDANSETRON 2 MG/ML
INJECTION INTRAMUSCULAR; INTRAVENOUS PRN
Status: DISCONTINUED | OUTPATIENT
Start: 2024-07-10 | End: 2024-07-10

## 2024-07-10 RX ORDER — ONDANSETRON 4 MG/1
4 TABLET, ORALLY DISINTEGRATING ORAL EVERY 30 MIN PRN
Status: DISCONTINUED | OUTPATIENT
Start: 2024-07-10 | End: 2024-07-10 | Stop reason: HOSPADM

## 2024-07-10 RX ORDER — OXYMETAZOLINE HYDROCHLORIDE 0.05 G/100ML
SPRAY NASAL PRN
Status: DISCONTINUED | OUTPATIENT
Start: 2024-07-10 | End: 2024-07-10 | Stop reason: HOSPADM

## 2024-07-10 RX ORDER — IBUPROFEN 600 MG/1
600 TABLET, FILM COATED ORAL EVERY 6 HOURS PRN
Qty: 30 TABLET | Refills: 0 | Status: SHIPPED | OUTPATIENT
Start: 2024-07-10

## 2024-07-10 RX ORDER — FENTANYL CITRATE 50 UG/ML
25 INJECTION, SOLUTION INTRAMUSCULAR; INTRAVENOUS EVERY 5 MIN PRN
Status: DISCONTINUED | OUTPATIENT
Start: 2024-07-10 | End: 2024-07-10 | Stop reason: HOSPADM

## 2024-07-10 RX ORDER — OXYCODONE HYDROCHLORIDE 5 MG/1
5 TABLET ORAL
Status: DISCONTINUED | OUTPATIENT
Start: 2024-07-10 | End: 2024-07-11 | Stop reason: HOSPADM

## 2024-07-10 RX ORDER — FENTANYL CITRATE 50 UG/ML
50 INJECTION, SOLUTION INTRAMUSCULAR; INTRAVENOUS EVERY 5 MIN PRN
Status: DISCONTINUED | OUTPATIENT
Start: 2024-07-10 | End: 2024-07-10 | Stop reason: HOSPADM

## 2024-07-10 RX ORDER — OXYCODONE HYDROCHLORIDE 5 MG/1
10 TABLET ORAL
Status: DISCONTINUED | OUTPATIENT
Start: 2024-07-10 | End: 2024-07-11 | Stop reason: HOSPADM

## 2024-07-10 RX ORDER — MEPERIDINE HYDROCHLORIDE 25 MG/ML
12.5 INJECTION INTRAMUSCULAR; INTRAVENOUS; SUBCUTANEOUS EVERY 5 MIN PRN
Status: DISCONTINUED | OUTPATIENT
Start: 2024-07-10 | End: 2024-07-10 | Stop reason: HOSPADM

## 2024-07-10 RX ORDER — ONDANSETRON 2 MG/ML
4 INJECTION INTRAMUSCULAR; INTRAVENOUS EVERY 30 MIN PRN
Status: DISCONTINUED | OUTPATIENT
Start: 2024-07-10 | End: 2024-07-10 | Stop reason: HOSPADM

## 2024-07-10 RX ADMIN — PROPOFOL 250 MCG/KG/MIN: 10 INJECTION, EMULSION INTRAVENOUS at 13:28

## 2024-07-10 RX ADMIN — PROPOFOL 50 MG: 10 INJECTION, EMULSION INTRAVENOUS at 13:07

## 2024-07-10 RX ADMIN — FENTANYL CITRATE 50 MCG: 50 INJECTION, SOLUTION INTRAMUSCULAR; INTRAVENOUS at 12:22

## 2024-07-10 RX ADMIN — Medication 100 MCG: at 12:55

## 2024-07-10 RX ADMIN — CEFAZOLIN SODIUM 2 G: 2 SOLUTION INTRAVENOUS at 12:33

## 2024-07-10 RX ADMIN — PROPOFOL 200 MCG/KG/MIN: 10 INJECTION, EMULSION INTRAVENOUS at 12:32

## 2024-07-10 RX ADMIN — ACETAMINOPHEN 975 MG: 325 TABLET ORAL at 10:43

## 2024-07-10 RX ADMIN — SODIUM CHLORIDE, SODIUM LACTATE, POTASSIUM CHLORIDE, CALCIUM CHLORIDE: 600; 310; 30; 20 INJECTION, SOLUTION INTRAVENOUS at 10:44

## 2024-07-10 RX ADMIN — PROPOFOL 50 MG: 10 INJECTION, EMULSION INTRAVENOUS at 13:12

## 2024-07-10 RX ADMIN — PROPOFOL 50 MG: 10 INJECTION, EMULSION INTRAVENOUS at 13:18

## 2024-07-10 RX ADMIN — Medication 50 MG: at 12:27

## 2024-07-10 RX ADMIN — Medication 0.5 MG: at 13:18

## 2024-07-10 RX ADMIN — DEXAMETHASONE SODIUM PHOSPHATE 10 MG: 10 INJECTION, SOLUTION INTRAMUSCULAR; INTRAVENOUS at 12:32

## 2024-07-10 RX ADMIN — PROPOFOL 200 MG: 10 INJECTION, EMULSION INTRAVENOUS at 12:27

## 2024-07-10 RX ADMIN — PROPOFOL 200 MCG/KG/MIN: 10 INJECTION, EMULSION INTRAVENOUS at 13:02

## 2024-07-10 RX ADMIN — LIDOCAINE HYDROCHLORIDE 100 MG: 20 INJECTION, SOLUTION INFILTRATION; PERINEURAL at 12:27

## 2024-07-10 RX ADMIN — ONDANSETRON 4 MG: 2 INJECTION INTRAMUSCULAR; INTRAVENOUS at 13:44

## 2024-07-10 RX ADMIN — FENTANYL CITRATE 50 MCG: 50 INJECTION, SOLUTION INTRAMUSCULAR; INTRAVENOUS at 12:32

## 2024-07-10 NOTE — ANESTHESIA CARE TRANSFER NOTE
Patient: Edgar Dunbar    Procedure: Procedure(s):  SEPTOPLASTY, NOSE  REDUCTION, INFERIOR NASAL TURBINATE       Diagnosis: Deviated nasal septum [J34.2]  Diagnosis Additional Information: No value filed.    Anesthesia Type:   General     Note:    Oropharynx: oropharynx clear of all foreign objects  Level of Consciousness: drowsy  Oxygen Supplementation: face mask  Level of Supplemental Oxygen (L/min / FiO2): 6  Independent Airway: airway patency satisfactory and stable  Dentition: dentition unchanged  Vital Signs Stable: post-procedure vital signs reviewed and stable  Report to RN Given: handoff report given  Patient transferred to: PACU  Comments: Resps easy and reg. Report to PACU RN   Handoff Report: Identifed the Patient, Identified the Reponsible Provider, Reviewed the pertinent medical history, Discussed the surgical course, Reviewed Intra-OP anesthesia mangement and issues during anesthesia, Set expectations for post-procedure period and Allowed opportunity for questions and acknowledgement of understanding      Vitals:  Vitals Value Taken Time   /85 07/10/24 1348   Temp 36.6  C (97.9  F) 07/10/24 1348   Pulse 90 07/10/24 1351   Resp 12 07/10/24 1351   SpO2 96 % 07/10/24 1351   Vitals shown include unfiled device data.    Electronically Signed By: NING Adams CRNA  July 10, 2024  1:52 PM

## 2024-07-10 NOTE — OP NOTE
Preoperative diagnosis: Nasal septal deviation and turbinate hypertrophy    Postoperative diagnosis: Same    Procedure: Septoplasty and bilateral inferior turbinate reduction with shaver and outfracture with Boyes elevator    Surgeon: Remi Colon MD DDS    Anesthesia: General by endotracheal intubation    Estimated blood loss: 10 cc    Operative indications and consent: This is a 54-year-old gentleman who has sustained a nasal fracture in the past and this complete obstruction of the left side of his nose with nasal septal deviation.  At the same time turbinates hypertrophied.  Understanding risk and benefits of the procedure patient has signed consent accordingly.    Operative details: Patient was brought the operating room placed supine operating table where general esthesia by endotracheal ovation was induced.  Timeout was called all surgical sites were identified.  At this time approximately 12 cc of 1% Xylocaine with 1 200,000 epinephrine were infiltrated into the nasal septum and inferior turbinates.  Topical oxymetazoline was placed in both nares.  Patient was then prepped and draped in a sterile fashion.    The patient then underwent turbinate reduction with the shaver bilaterally and then outfracture with the Boyes elevator bilaterally.    Attention was then turned to the septum where a left hemitransfixion incision was made with dissection down to the cartilage itself.  It was then dissected posteriorly to the bony cartilaginous junction which was then .  All the bony deformity posterior to the junction was removed with a better way posteriorly and then segmentally cartilage was removed proceeding posterior to anteriorly as well to improve the airway.  Subsequently the maxillary bony crest was removed as well because of his deformity into the left nares.  Here is rent was made along the left side but the tissue was intact on the right side.  With this the septum swung nicely to the  midline.    The hemitransfixion incision was closed with 5-0 chromic in interrupted fashion and then resupported the caudal septum was made by mattress suture with a 4-0 chromic.  With the improvement of the airway the posterior aspect was inspected and suctioned clear of blood and mucus.    Becker splints were then placed bilaterally laced with bacitracin to the nasal passages and secured anteriorly with a 3-0 nylon.  The nares were suctioned clear and the stomach contents aspirated.    The patient was then returned to anesthesia where he was fully awakened, extubated, and taken to recovery room in stable condition

## 2024-07-10 NOTE — ANESTHESIA PROCEDURE NOTES
Airway       Patient location during procedure: OR       Procedure Start/Stop Times: 7/10/2024 12:29 PM  Staff -        CRNA: Elizabeth Lenz APRN CRNA       Performed By: CRNA  Consent for Airway        Urgency: elective  Indications and Patient Condition       Indications for airway management: alice-procedural       Induction type:intravenous      Final Airway Details       Final airway type: endotracheal airway       Successful airway: ETT - single  Endotracheal Airway Details        ETT size (mm): 8.0       Cuffed: yes       Cuff volume (mL): 7       Successful intubation technique: direct laryngoscopy       DL Blade Type: Parsons 2       Grade View of Cords: 1       Adjucts: stylet       Position: Center       Measured from: gums/teeth       Secured at (cm): 24    Post intubation assessment        Placement verified by: capnometry, equal breath sounds and chest rise        Number of attempts at approach: 1       Secured with: tape       Ease of procedure: easy       Dentition: Intact and Unchanged    Medication(s) Administered   Medication Administration Time: 7/10/2024 12:29 PM

## 2024-07-10 NOTE — BRIEF OP NOTE
[facility]    Brief Operative Note    Pre-operative diagnosis: Deviated nasal septum [J34.2]  Post-operative diagnosis Same as pre-operative diagnosis    Procedure: SEPTOPLASTY, NOSE, N/A - Nose  REDUCTION, INFERIOR NASAL TURBINATE, Bilateral - Nose    Surgeon: Surgeons and Role:     * Remi Boo MD - Primary  Anesthesia: General   Estimated Blood Loss: Less than 10 ml    Drains: None  Specimens: * No specimens in log *  Findings:   None.  Complications: None.  Implants: * No implants in log *

## 2024-07-10 NOTE — DISCHARGE INSTRUCTIONS
Nasal Septum Repair Surgery: What to Expect at Home  Your Recovery  You may have some swelling of your nose, upper lip, cheeks, or around your eyes after nasal surgery. You may have some bruises around your nose and eyes. Your nose may be sore and will bleed. This may last for several days after surgery.  The tip of your nose and your upper lip and gums may be numb. Feeling will return in a few weeks to a few months. Your sense of smell may not be as good after surgery. But it will improve and will often return to normal in 1 to 2 months.  You will have a drip pad under your nose to collect mucus and blood. Change it only when it bleeds through. You may have to do this every hour for 24 hours after surgery.  You will probably be able to return to work or school in a few days and to your normal routine in about 3 weeks. But this varies with your job and how much surgery you had. Most people recover fully in 1 to 2 months.  You will have to visit your doctor during the 3 to 4 months after your surgery. Your doctor will check to see that your nose is healing well.  This care sheet gives you a general idea about how long it will take for you to recover. But each person recovers at a different pace. Follow the steps below to get better as quickly as possible.  How can you care for yourself at home?  Activity    Rest when you feel tired. Getting enough sleep will help you recover. Do not lie flat. Raise your head with two or three pillows. This can reduce swelling. Try to sleep on your back for the month after surgery. You can also sleep in a reclining chair.     Try to walk each day. Start by walking a little more than you did the day before. Bit by bit, increase the amount you walk. Walking boosts blood flow and helps prevent pneumonia and constipation. Also, try to sit and stand as much as you can.     For 1 week, try not to bend over or lift anything heavier than 10 pounds. This may include a child, heavy grocery bags  and milk containers, a heavy briefcase or backpack, cat litter or dog food bags, or a vacuum .     You can take a shower or bath. Avoid swimming for 6 weeks.     Avoid strenuous activities, such as bicycle riding, jogging, weight lifting, or aerobic exercise, for 1 week or until your doctor says it is okay.     You may drive when you are no longer taking prescription pain pills and feel up to it.   Diet    You can eat your normal diet. If your stomach is upset, try bland, low-fat foods like plain rice, broiled chicken, toast, and yogurt.     You may notice that your bowel movements are not regular right after your surgery. This is common. Try to avoid constipation and straining with bowel movements. You may want to take a fiber supplement every day. If you have not had a bowel movement after a couple of days, ask your doctor about taking a mild laxative.   Medicines    Your doctor will tell you if and when you can restart your medicines. You will also get instructions about taking any new medicines.     If you stopped taking aspirin or some other blood thinner, your doctor will tell you when to start taking it again.     Do not take aspirin, aspirin-containing medicines, or anti-inflammatory medicines such as ibuprofen (Advil, Motrin) or naproxen (Aleve) for 3 weeks following surgery unless your doctor says it is okay.     Be safe with medicines. Read and follow all instructions on the label.  If you are not taking a prescription pain medicine, ask your doctor if you can take an over- the-counter medicine.  If the doctor gave you a prescription medicine for pain, take it as prescribed.  Store your prescription pain medicines where no one else can get to them. When you are done using them, dispose of them quickly and safely. Your local pharmacy or hospital may have a drop-off site.     If your doctor prescribed antibiotics, take them as directed. Do not stop taking them just because you feel better. You need  to take the full course of antibiotics.     If you think your pain medicine is making you sick to your stomach:  Take your medicine after meals (unless your doctor has told you not to).  Ask your doctor for a different pain medicine.   Incision care    You will have a drip pad under your nose to collect blood. Change it only when it has bled through. You may have to do this every hour for 24 hours after surgery. When bleeding stops, you can remove it.     If you have packing in your nose, leave it in. Your doctor will take it out.   Ice and elevation    To help with swelling and pain, put ice or a cold pack on your nose for 10 to 20 minutes at a time. Put a thin cloth between the ice and your skin.     Sleep with your head raised up. You can also sleep in a reclining chair.   Other instructions    Do not blow your nose for 1 week after surgery.     Do not put anything into your nose.     If you must sneeze, open your mouth and sneeze naturally.     Use saline (saltwater) nasal washes to help keep your nasal passages open and wash out mucus and dried blood. You can buy saline nose sprays at a grocery store or drugstore. Follow the instructions on the package. Or you can make your own at home. Add 1 teaspoon of salt and 1 teaspoon of baking soda to 2 cups of distilled or boiled and cooled water. Fill a squeeze bottle with the nasal wash. Then put the tip into your nostril, and lean over the sink. With your mouth open, gently squirt the liquid. Repeat on the other side.     You can wear your glasses when you wish. Do not wear contacts until the day after the surgery.   Follow-up care is a key part of your treatment and safety. Be sure to make and go to all appointments, and call your doctor if you are having problems. It's also a good idea to know your test results and keep a list of the medicines you take.  When should you call for help?   Call 911 anytime you think you may need emergency care. For example, call if:    " You passed out (lost consciousness).     You have severe trouble breathing.   Call your doctor now or seek immediate medical care if:    You have changes in your vision or lots of sudden swelling around your eyes.     You have constant clear, watery discharge from your nose.     You bleed through the bandage more quickly than what you've been told is normal.     You have a new or worse fever.   Watch closely for any changes in your health, and be sure to contact your doctor if:    You have pain that does not get better after you take pain medicine.     You do not get better as expected.   Where can you learn more?  Go to https://www.[employer].net/patiented  Enter R944 in the search box to learn more about \"Nasal Septum Repair Surgery: What to Expect at Home.\"  Current as of: September 27, 2023               Content Version: 14.0    4405-3985 [employer].   Care instructions adapted under license by your healthcare professional. If you have questions about a medical condition or this instruction, always ask your healthcare professional. [employer] disclaims any warranty or liability for your use of this information.        [facility]  Home Care Following Anesthesia  For 24 hours after surgery:  Get plenty of rest.  A responsible adult must stay with you for at least 24 hours after you leave the surgery center.  Do not drive or use heavy equipment.  If you have weakness or tingling, don't drive or use heavy equipment until this feeling goes away.   Do not drink alcohol.   Avoid strenuous or risky activities.  Ask for help when climbing stairs.  You may feel lightheaded.  IF so, sit for a few minutes before standing.  Have someone help you get up.   If you have nausea (feel sick to your stomach): Drink only clear liquids such as apple juice, ginger ale, broth or 7-Up.  Rest may also help.  Be sure to drink enough fluids.  Move to a regular diet as you " feel able.   You may have a slight fever.  Call the doctor if your fever is over 100 F (37.7 C) (taken under the tongue) or lasts longer than 24 hours.  You may have a dry mouth, a sore throat, muscle aches or trouble sleeping. These should go away after 24 hours.  Do not make important or legal decisions.   It is recommended to avoid smoking.               Tips for taking pain medications  To get the best pain relief possible, remember these points:  Take pain medications as directed, before pain becomes severe.  Pain medication can upset your stomach: taking it with food may help.  Constipation is a common side effect of pain medication. Drink plenty of  fluids.  Eat foods high in fiber. Take a stool softener if recommended by your doctor or pharmacist.  Do not drink alcohol, drive or operate machinery while taking pain medications.  Ask about other ways to control pain, such as with heat, ice or relaxation.    Tylenol/Acetaminophen Consumption    If you feel your pain relief is insufficient, you may take Tylenol/Acetaminophen in addition to your narcotic pain medication.   Be careful not to exceed 4,000 mg of Tylenol/Acetaminophen in a 24 hour period from all sources.  If you are taking extra strength Tylenol/acetaminophen (500 mg), the maximum dose is 8 tablets in 24 hours.  If you are taking regular strength acetaminophen (325 mg), the maximum dose is 12 tablets in 24 hours.    Call a doctor for any of the following:  Signs of infection (fever, growing tenderness at the surgery site, a large amount of drainage or bleeding, severe pain, foul-smelling drainage, redness, swelling).  It has been over 8 to 10 hours since surgery and you are still not able to urinate (pass water).  Headache for over 24 hours.  Numbness, tingling or weakness the day after surgery (if you had spinal anesthesia).  Signs of Covid-19 infection (temperature over 100 degrees, shortness of breath, cough, loss of taste/smell, generalized body  "aches, persistent headache, chills, sore throat, nausea/vomiting/diarrhea)  Your doctor is:  Dr. Remi Boo, ENT Otolaryngology: [phone]                    Or dial [phone] and ask for the resident on call for:  ENT Otolaryngology  For emergency care, call the:  [facility]:  [phone] (TTY for hearing impaired: [phone])                Turbinoplasty or Turbinectomy Surgery: What to Expect at Home  Your Recovery  Turbinoplasty and turbinectomy are nose surgeries. They can make it easier for you to breathe. You may have one of these surgeries if the turbinates in your nose are too large and block the airways in your nose.  You may have a drip pad under your nose to collect mucus and blood. Change it only when it bleeds through. You may have to do this every hour for 24 hours after surgery.  You may have some swelling of your nose, upper lip, or cheeks, or around your eyes. You may have some bruises around your nose and eyes. Your nose may be sore and will bleed. You may feel \"stuffed up\" as though you have a bad head cold. This will last for several days after surgery.  The tip of your nose and your upper lip and gums may be numb. Feeling will return in a few weeks to a few months. Your sense of smell may not be as good after surgery. It will likely return to normal in 1 to 2 months.  Most people can go back to work or school in about 1 week and to their normal routine in about 3 weeks. But this varies with your job and the extent of your surgery. You will probably feel fully recovered in 1 to 2 months.  You may need to visit your doctor regularly for 3 to 4 months after your surgery. Your doctor will check to see that your nose is healing well.  This care sheet gives you a general idea about how long it will take for you to recover. But each person recovers at a different pace. Follow the steps below to get better as quickly as possible.  How can you care for yourself at " home?  Activity    Rest when you feel tired. Getting enough sleep will help you recover. Do not lie flat. Raise your head with three or four pillows. This can reduce swelling. Try to sleep on your back during the month after surgery. You can also sleep in a reclining chair.     Try to walk each day. Start by walking a little more than you did the day before. Bit by bit, increase the amount you walk. Walking boosts blood flow and helps prevent pneumonia and constipation. Also try to sit and stand as much as you can.     For 1 week, try not to bend over or lift anything heavier than 10 pounds. This may include heavy grocery bags and milk containers, a heavy briefcase or backpack, cat litter or dog food bags, a vacuum , or a child.     You can take a shower or bath. Use lukewarm, not hot, water.     Avoid strenuous activities, such as biking, jogging, weight lifting, or aerobic exercise, for 1 week or until your doctor says it is okay.     You may drive when you are no longer taking prescription pain medicine and feel up to it.     You will probably be able to return to work or school in about 1 week and to your normal routine in about 3 weeks. However, this varies with your job and how much surgery you had.   Diet    You can eat your normal diet. If your stomach is upset, try bland, low-fat foods like plain rice, broiled chicken, toast, and yogurt.     You may notice that your bowel movements are not regular right after your surgery. This is common. Try to avoid constipation and straining with bowel movements. You may want to take a fiber supplement every day. If you have not had a bowel movement after a couple of days, ask your doctor about taking a mild laxative.   Medicines    Your doctor will tell you if and when you can restart your medicines. You also will be given instructions about taking any new medicines.     If you stopped taking aspirin or some other blood thinner, your doctor will tell you when to  start taking it again.     Do not take aspirin, aspirin-containing medicines, or anti-inflammatory medicines such as ibuprofen (Advil, Motrin) or naproxen (Aleve) for 3 weeks following surgery unless your doctor says it is okay.     Be safe with medicines. Take pain medicines exactly as directed.  If the doctor gave you a prescription medicine for pain, take it as prescribed.  Do not take two or more pain medicines at the same time unless the doctor told you to. Many pain medicines have acetaminophen, which is Tylenol. Too much acetaminophen (Tylenol) can be harmful.     If your doctor prescribed antibiotics, take them as directed. Do not stop taking them just because you feel better. You need to take the full course of antibiotics.     If you think your pain medicine is making you sick to your stomach:  Take your medicine after meals (unless your doctor has told you not to).  Ask your doctor for a different pain medicine.   Incision care    You may have a drip pad under your nose to collect mucus and blood. Change it only when it bleeds through. You may have to do this every hour for 24 hours after surgery. When bleeding stops, you can remove it.     If you have packing in your nose, leave it in. Your doctor will take it out.   Ice and elevation    To help with swelling and pain, put ice or a cold pack on your nose for 10 to 20 minutes at a time. Put a thin cloth between the ice and your skin.     Sleep with your head raised with three or four pillows. This can reduce swelling. You can also sleep in a reclining chair.   Other instructions    Do not blow your nose for 1 week after surgery, or until your doctor says it is okay.     Do not put anything into your nose.     If you must sneeze, open your mouth and sneeze naturally.     Keep your mouth clean. Rinse your mouth with salt water or a nonalcohol mouthwash after each meal and before bedtime.     Use saline (saltwater) nasal washes to help keep your nasal  "passages open and wash out mucus and dried blood. You can buy saline nose sprays at a grocery store or drugstore. Follow the instructions on the package. Or you can make your own at home. Add 1 teaspoon of salt and 1 teaspoon of baking soda to 2 cups of distilled or boiled and cooled water. Fill a squeeze bottle with the nasal wash. Then put the tip into your nostril, and lean over the sink. With your mouth open, gently squirt the liquid. Repeat on the other side.     You can wear your glasses when you wish. Do not wear contacts until the day after the surgery.     Do not travel by airplane for at least 2 weeks. Your sinuses are still healing, and the changes in air pressure can affect them.   Follow-up care is a key part of your treatment and safety. Be sure to make and go to all appointments, and call your doctor if you are having problems. It's also a good idea to know your test results and keep a list of the medicines you take.  When should you call for help?   Call 911 anytime you think you may need emergency care. For example, call if:    You passed out (lost consciousness).     You have severe trouble breathing.     You have sudden chest pain, shortness of breath, or you cough up blood.   Call your doctor now or seek immediate medical care if:    You have bleeding through the nasal packing that is not slowing.     You have symptoms of infection, such as:  Increased pain, swelling, warmth, or redness.  Red streaks coming from the area.  Pus draining from the area.  A fever.     You have new pain, or your pain gets worse.   Watch closely for any changes in your health, and be sure to contact your doctor if:    You do not get better as expected.   Where can you learn more?  Go to https://www.healthwise.net/patiented  Enter V705 in the search box to learn more about \"Turbinoplasty or Turbinectomy Surgery: What to Expect at Home.\"  Current as of: September 27, 2023               Content Version: 14.0    5031-3901 " [employer].   Care instructions adapted under license by your healthcare professional. If you have questions about a medical condition or this instruction, always ask your healthcare professional. Healthwise, [employer] disclaims any warranty or liability for your use of this information.

## 2024-07-10 NOTE — ANESTHESIA POSTPROCEDURE EVALUATION
Patient: Edgar Dunbar    Procedure: Procedure(s):  SEPTOPLASTY, NOSE  REDUCTION, INFERIOR NASAL TURBINATE       Anesthesia Type:  General    Note:  Disposition: Outpatient   Postop Pain Control: Uneventful            Sign Out: Well controlled pain   PONV:    Neuro/Psych: Uneventful            Sign Out: Acceptable/Baseline neuro status   Airway/Respiratory: Uneventful            Sign Out: Acceptable/Baseline resp. status   CV/Hemodynamics: Uneventful            Sign Out: Acceptable CV status; No obvious hypovolemia; No obvious fluid overload   Other NRE: NONE   DID A NON-ROUTINE EVENT OCCUR?            Last vitals:  Vitals Value Taken Time   /85 07/10/24 1400   Temp 36.6  C (97.9  F) 07/10/24 1400   Pulse 79 07/10/24 1400   Resp 12 07/10/24 1400   SpO2 95 % 07/10/24 1400       Electronically Signed By: Dyan Juarez MD  July 10, 2024  2:38 PM

## 2024-07-10 NOTE — ANESTHESIA PREPROCEDURE EVALUATION
"Anesthesia Pre-Procedure Evaluation    Patient: Edgar Dunbar   MRN: [id number] : 1970        Procedure : Procedure(s):  SEPTOPLASTY, NOSE  REDUCTION, INFERIOR NASAL TURBINATE          No past medical history on file.   History reviewed. No pertinent surgical history.   Allergies   Allergen Reactions    Seasonal Allergies       Social History     Tobacco Use    Smoking status: Never    Smokeless tobacco: Never    Tobacco comments:     Partner smokes outside   Substance Use Topics    Alcohol use: Yes     Comment: Alcoholic Drinks/day: 20drinks/wk      Wt Readings from Last 1 Encounters:   07/10/24 73.5 kg (162 lb)           Physical Exam    Airway        Mallampati: II   TM distance: > 3 FB   Neck ROM: full   Mouth opening: > 3 cm    Respiratory Devices and Support         Dental       (+) Minor Abnormalities - some fillings, tiny chips      Cardiovascular   cardiovascular exam normal          Pulmonary   pulmonary exam normal                OUTSIDE LABS:  CBC:   Lab Results   Component Value Date    WBC 6.3 10/02/2023    WBC 5.7 2018    HGB 14.6 10/02/2023    HGB 14.4 2018    HCT 42.5 10/02/2023    HCT 41.9 2018     10/02/2023     2018     BMP:   Lab Results   Component Value Date     10/02/2023     2018    POTASSIUM 5.1 10/02/2023    POTASSIUM 4.4 2018    CHLORIDE 103 10/02/2023    CHLORIDE 103 2018    CO2 25 10/02/2023    CO2 25 2018    BUN 14.2 10/02/2023    BUN 16 2018    CR 1.05 10/02/2023    CR 1.11 2018     (H) 10/02/2023    GLC 83 2018     COAGS: No results found for: \"PTT\", \"INR\", \"FIBR\"  POC: No results found for: \"BGM\", \"HCG\", \"HCGS\"  HEPATIC:   Lab Results   Component Value Date    ALBUMIN 4.6 10/02/2023    PROTTOTAL 8.1 10/02/2023    ALT 46 10/02/2023    AST 44 10/02/2023    ALKPHOS 59 10/02/2023    BILITOTAL 0.6 10/02/2023     OTHER:   Lab Results   Component Value Date    ABDIAZIZ 9.9 10/02/2023 "       Anesthesia Plan    ASA Status:  2    NPO Status:  NPO Appropriate    Anesthesia Type: General.     - Airway: ETT      Maintenance: Balanced.        Consents    Anesthesia Plan(s) and associated risks, benefits, and realistic alternatives discussed. Questions answered and patient/representative(s) expressed understanding.     - Discussed: Risks, Benefits and Alternatives for BOTH SEDATION and the PROCEDURE were discussed     - Discussed with:  Patient      - Extended Intubation/Ventilatory Support Discussed: No.      - Patient is DNR/DNI Status: No     Use of blood products discussed: No .     Postoperative Care    Pain management: Multi-modal analgesia.   PONV prophylaxis: Ondansetron (or other 5HT-3)     Comments:               Dyan Juarez MD    I have reviewed the pertinent notes and labs in the chart from the past 30 days and (re)examined the patient.  Any updates or changes from those notes are reflected in this note.

## 2024-07-11 ENCOUNTER — TELEPHONE (OUTPATIENT)
Dept: OTOLARYNGOLOGY | Facility: CLINIC | Age: 54
End: 2024-07-11
Payer: COMMERCIAL

## 2024-07-11 NOTE — TELEPHONE ENCOUNTER
[facility] Call Center    Phone Message    May a detailed message be left on voicemail: yes     Reason for Call: Other: Pt needs nurses appt to remove stents on 7/17/24.  Please call pt to schedule.  List [facility] [place] [facility] according to the [facility] location, thanks     Action Taken: Other: ENT    Travel Screening: Not Applicable     Date of Service:

## 2024-07-17 ENCOUNTER — ALLIED HEALTH/NURSE VISIT (OUTPATIENT)
Dept: OTOLARYNGOLOGY | Facility: CLINIC | Age: 54
End: 2024-07-17
Payer: COMMERCIAL

## 2024-07-17 DIAGNOSIS — J34.2 DEVIATED NASAL SEPTUM: Primary | ICD-10-CM

## 2024-07-17 PROCEDURE — 99207 PR NO CHARGE NURSE ONLY: CPT

## 2024-07-17 NOTE — PROGRESS NOTES
Patient came to clinic for a stent removal. Suture removed. Bilateral stents removed easily. Patient denied further complaints of pain. Patient will follow up with Dr. Boo as scheduled.    Silvia Barajas BSN, RN

## 2024-07-24 ENCOUNTER — OFFICE VISIT (OUTPATIENT)
Dept: OTOLARYNGOLOGY | Facility: CLINIC | Age: 54
End: 2024-07-24
Payer: COMMERCIAL

## 2024-07-24 VITALS
TEMPERATURE: 97.7 F | BODY MASS INDEX: 25.76 KG/M2 | OXYGEN SATURATION: 98 % | HEIGHT: 68 IN | SYSTOLIC BLOOD PRESSURE: 154 MMHG | HEART RATE: 77 BPM | DIASTOLIC BLOOD PRESSURE: 82 MMHG | WEIGHT: 170 LBS

## 2024-07-24 DIAGNOSIS — J34.2 DEVIATED NASAL SEPTUM: Primary | ICD-10-CM

## 2024-07-24 PROCEDURE — 99024 POSTOP FOLLOW-UP VISIT: CPT | Performed by: OTOLARYNGOLOGY

## 2024-07-24 ASSESSMENT — PAIN SCALES - GENERAL: PAINLEVEL: NO PAIN (0)

## 2024-07-24 NOTE — PROGRESS NOTES
Edgar Dunbar returns to clinic status post septoplasty and turbinate reduction.  He had his splints removed last week and is here today for my follow-up.    He states he is breathing fine with no issues has no nosebleeds and is actually sleeping better.    Examination shows the septum relatively straight which is slight deviation to the left.  Turbinates are reduced.    Assessment: Stable status post turbinate reduction and nasal septoplasty.    Plan: As noted before he can return to normal activity.  He will follow-up with me as needed.

## 2024-07-24 NOTE — PATIENT INSTRUCTIONS
You were seen in the ENT Clinic today by Dr. Boo. If you have any questions or concerns after your appointment, please contact us (see below)    Please return to clinic as needed    How to Contact Us:  Send a Raiing message to your provider. Our team will respond to you via Raiing. Occasionally, we will need to call you to get further information.  For urgent matters (Monday-Friday), call the ENT Clinic: [phone] and speak with a call center team member - they will route your call appropriately.   If you'd like to speak directly with a nurse, please find our contact information below. We do our best to check voicemail frequently throughout the day, and will work to call you back within 1-2 days. For urgent matters, please use the general clinic phone numbers listed above.    Dai MORROW RN, BSN   RN Care Coordinator, ENT Clinic  [facility]  Direct: [phone]  Kadie MANRIQUE LPN  Direct: [phone]

## 2024-07-24 NOTE — LETTER
7/24/2024       RE: Edgar Dunbar  [address] [place] [address]     Dear Colleague,    Thank you for referring your patient, Edgar Dunbar, to the [facility] [place] at . Please see a copy of my visit note below.    Edgar Dunbar returns to clinic status post septoplasty and turbinate reduction.  He had his splints removed last week and is here today for my follow-up.    He states he is breathing fine with no issues has no nosebleeds and is actually sleeping better.    Examination shows the septum relatively straight which is slight deviation to the left.  Turbinates are reduced.    Assessment: Stable status post turbinate reduction and nasal septoplasty.    Plan: As noted before he can return to normal activity.  He will follow-up with me as needed.      Again, thank you for allowing me to participate in the care of your patient.      Sincerely,    Remi Boo MD

## 2024-07-24 NOTE — NURSING NOTE
"Chief Complaint   Patient presents with    RECHECK     1-2 week post op      .Blood pressure (!) 154/82, pulse 77, temperature 97.7  F (36.5  C), height 1.727 m (5' 8\"), weight 77.1 kg (170 lb), SpO2 98%.    Walter March LPN    "

## 2024-09-03 ENCOUNTER — PATIENT OUTREACH (OUTPATIENT)
Dept: CARE COORDINATION | Facility: CLINIC | Age: 54
End: 2024-09-03
Payer: COMMERCIAL

## 2024-09-17 ENCOUNTER — PATIENT OUTREACH (OUTPATIENT)
Dept: CARE COORDINATION | Facility: CLINIC | Age: 54
End: 2024-09-17
Payer: COMMERCIAL

## 2024-09-18 DIAGNOSIS — F41.1 GAD (GENERALIZED ANXIETY DISORDER): ICD-10-CM

## 2024-09-18 RX ORDER — CITALOPRAM HYDROBROMIDE 10 MG/1
10 TABLET ORAL DAILY
Qty: 90 TABLET | Refills: 0 | Status: SHIPPED | OUTPATIENT
Start: 2024-09-18

## 2024-09-18 NOTE — TELEPHONE ENCOUNTER
Patient is due for a physical. Refilled citalopram for 90 days. Please call and inform them and help make physical in this time for further refills.    Driss Peralta MD

## 2024-12-29 SDOH — HEALTH STABILITY: PHYSICAL HEALTH: ON AVERAGE, HOW MANY DAYS PER WEEK DO YOU ENGAGE IN MODERATE TO STRENUOUS EXERCISE (LIKE A BRISK WALK)?: 5 DAYS

## 2024-12-29 SDOH — HEALTH STABILITY: PHYSICAL HEALTH: ON AVERAGE, HOW MANY MINUTES DO YOU ENGAGE IN EXERCISE AT THIS LEVEL?: 30 MIN

## 2024-12-29 ASSESSMENT — SOCIAL DETERMINANTS OF HEALTH (SDOH): HOW OFTEN DO YOU GET TOGETHER WITH FRIENDS OR RELATIVES?: TWICE A WEEK

## 2024-12-31 ENCOUNTER — OFFICE VISIT (OUTPATIENT)
Dept: FAMILY MEDICINE | Facility: CLINIC | Age: 54
End: 2024-12-31
Payer: COMMERCIAL

## 2024-12-31 VITALS
RESPIRATION RATE: 17 BRPM | DIASTOLIC BLOOD PRESSURE: 83 MMHG | OXYGEN SATURATION: 98 % | SYSTOLIC BLOOD PRESSURE: 137 MMHG | BODY MASS INDEX: 25.23 KG/M2 | TEMPERATURE: 97.1 F | HEIGHT: 68 IN | HEART RATE: 91 BPM | WEIGHT: 166.44 LBS

## 2024-12-31 DIAGNOSIS — Z12.5 SCREENING FOR PROSTATE CANCER: ICD-10-CM

## 2024-12-31 DIAGNOSIS — J34.2 DEVIATED NASAL SEPTUM: ICD-10-CM

## 2024-12-31 DIAGNOSIS — Z00.00 HEALTHCARE MAINTENANCE: ICD-10-CM

## 2024-12-31 DIAGNOSIS — F41.1 GAD (GENERALIZED ANXIETY DISORDER): ICD-10-CM

## 2024-12-31 DIAGNOSIS — Z00.00 ANNUAL PHYSICAL EXAM: Primary | ICD-10-CM

## 2024-12-31 LAB
ALBUMIN SERPL BCG-MCNC: 4.5 G/DL (ref 3.5–5.2)
ALP SERPL-CCNC: 65 U/L (ref 40–150)
ALT SERPL W P-5'-P-CCNC: 78 U/L (ref 0–70)
ANION GAP SERPL CALCULATED.3IONS-SCNC: 13 MMOL/L (ref 7–15)
AST SERPL W P-5'-P-CCNC: 54 U/L (ref 0–45)
BILIRUB SERPL-MCNC: 1 MG/DL
BUN SERPL-MCNC: 17.7 MG/DL (ref 6–20)
CALCIUM SERPL-MCNC: 10.1 MG/DL (ref 8.8–10.4)
CHLORIDE SERPL-SCNC: 99 MMOL/L (ref 98–107)
CHOLEST SERPL-MCNC: 273 MG/DL
CREAT SERPL-MCNC: 1.05 MG/DL (ref 0.67–1.17)
EGFRCR SERPLBLD CKD-EPI 2021: 84 ML/MIN/1.73M2
ERYTHROCYTE [DISTWIDTH] IN BLOOD BY AUTOMATED COUNT: 11.8 % (ref 10–15)
FASTING STATUS PATIENT QL REPORTED: ABNORMAL
FASTING STATUS PATIENT QL REPORTED: ABNORMAL
GLUCOSE SERPL-MCNC: 114 MG/DL (ref 70–99)
HCO3 SERPL-SCNC: 21 MMOL/L (ref 22–29)
HCT VFR BLD AUTO: 42.5 % (ref 40–53)
HDLC SERPL-MCNC: 75 MG/DL
HGB BLD-MCNC: 14.9 G/DL (ref 13.3–17.7)
LDLC SERPL CALC-MCNC: 181 MG/DL
MCH RBC QN AUTO: 31.7 PG (ref 26.5–33)
MCHC RBC AUTO-ENTMCNC: 35.1 G/DL (ref 31.5–36.5)
MCV RBC AUTO: 90 FL (ref 78–100)
NONHDLC SERPL-MCNC: 198 MG/DL
PLATELET # BLD AUTO: 348 10E3/UL (ref 150–450)
POTASSIUM SERPL-SCNC: 4.8 MMOL/L (ref 3.4–5.3)
PROT SERPL-MCNC: 8.1 G/DL (ref 6.4–8.3)
PSA SERPL DL<=0.01 NG/ML-MCNC: 1.62 NG/ML (ref 0–3.5)
RBC # BLD AUTO: 4.7 10E6/UL (ref 4.4–5.9)
SODIUM SERPL-SCNC: 133 MMOL/L (ref 135–145)
TRIGL SERPL-MCNC: 84 MG/DL
WBC # BLD AUTO: 4.9 10E3/UL (ref 4–11)

## 2024-12-31 PROCEDURE — G0103 PSA SCREENING: HCPCS | Performed by: STUDENT IN AN ORGANIZED HEALTH CARE EDUCATION/TRAINING PROGRAM

## 2024-12-31 PROCEDURE — 80061 LIPID PANEL: CPT | Performed by: STUDENT IN AN ORGANIZED HEALTH CARE EDUCATION/TRAINING PROGRAM

## 2024-12-31 PROCEDURE — 85027 COMPLETE CBC AUTOMATED: CPT | Performed by: STUDENT IN AN ORGANIZED HEALTH CARE EDUCATION/TRAINING PROGRAM

## 2024-12-31 PROCEDURE — 96127 BRIEF EMOTIONAL/BEHAV ASSMT: CPT | Performed by: STUDENT IN AN ORGANIZED HEALTH CARE EDUCATION/TRAINING PROGRAM

## 2024-12-31 PROCEDURE — 90715 TDAP VACCINE 7 YRS/> IM: CPT | Performed by: STUDENT IN AN ORGANIZED HEALTH CARE EDUCATION/TRAINING PROGRAM

## 2024-12-31 PROCEDURE — 90673 RIV3 VACCINE NO PRESERV IM: CPT | Performed by: STUDENT IN AN ORGANIZED HEALTH CARE EDUCATION/TRAINING PROGRAM

## 2024-12-31 PROCEDURE — 90471 IMMUNIZATION ADMIN: CPT | Performed by: STUDENT IN AN ORGANIZED HEALTH CARE EDUCATION/TRAINING PROGRAM

## 2024-12-31 PROCEDURE — 90480 ADMN SARSCOV2 VAC 1/ONLY CMP: CPT | Performed by: STUDENT IN AN ORGANIZED HEALTH CARE EDUCATION/TRAINING PROGRAM

## 2024-12-31 PROCEDURE — 80053 COMPREHEN METABOLIC PANEL: CPT | Performed by: STUDENT IN AN ORGANIZED HEALTH CARE EDUCATION/TRAINING PROGRAM

## 2024-12-31 PROCEDURE — 90472 IMMUNIZATION ADMIN EACH ADD: CPT | Performed by: STUDENT IN AN ORGANIZED HEALTH CARE EDUCATION/TRAINING PROGRAM

## 2024-12-31 PROCEDURE — 99213 OFFICE O/P EST LOW 20 MIN: CPT | Mod: 25 | Performed by: STUDENT IN AN ORGANIZED HEALTH CARE EDUCATION/TRAINING PROGRAM

## 2024-12-31 PROCEDURE — 91320 SARSCV2 VAC 30MCG TRS-SUC IM: CPT | Performed by: STUDENT IN AN ORGANIZED HEALTH CARE EDUCATION/TRAINING PROGRAM

## 2024-12-31 PROCEDURE — 36415 COLL VENOUS BLD VENIPUNCTURE: CPT | Performed by: STUDENT IN AN ORGANIZED HEALTH CARE EDUCATION/TRAINING PROGRAM

## 2024-12-31 PROCEDURE — 99396 PREV VISIT EST AGE 40-64: CPT | Mod: 25 | Performed by: STUDENT IN AN ORGANIZED HEALTH CARE EDUCATION/TRAINING PROGRAM

## 2024-12-31 RX ORDER — CITALOPRAM HYDROBROMIDE 10 MG/1
10 TABLET ORAL DAILY
Qty: 90 TABLET | Refills: 3 | Status: SHIPPED | OUTPATIENT
Start: 2024-12-31

## 2024-12-31 ASSESSMENT — PAIN SCALES - GENERAL: PAINLEVEL_OUTOF10: NO PAIN (0)

## 2024-12-31 NOTE — PROGRESS NOTES
Assessment/ Plan   54-year-old male with past medical history of generalized anxiety disorder who presents for annual physical exam.     1. Annual physical exam (Primary)  - Comprehensive metabolic panel (BMP + Alb, Alk Phos, ALT, AST, Total. Bili, TP); Future  - Lipid panel reflex to direct LDL Fasting; Future  - CBC with platelets; Future    2. Healthcare maintenance    3. SHARONDA (generalized anxiety disorder)  Continues to do well on citalopram. No side effects    Current Treatment:  Citalopram 10 mg daily        10/2/2023     2:32 PM   PHQ   PHQ-9 Total Score 2   Q9: Thoughts of better off dead/self-harm past 2 weeks Not at all           10/2/2023     2:32 PM 11/8/2023     6:26 PM   SHARONDA-7 SCORE   Total Score  0 (minimal anxiety)   Total Score 6 0       - citalopram (CELEXA) 10 MG tablet; Take 1 tablet (10 mg) by mouth daily.  Dispense: 90 tablet; Refill: 3    4. Screening for prostate cancer  - PSA, screen; Future    Follow-up in: 1 year for physical    Driss Peralta MD    Counseling  Appropriate preventive services were discussed with this patient, including applicable screening as appropriate for fall prevention, nutrition, physical activity, Tobacco-use cessation, weight loss and cognition    Subjective:     Edgar Dunbar is a 54 year old male who presents for an annual exam.     Chief Complaint   Patient presents with    Physical     Colonoscopy 1/24 repeat in 10 years  Prostate Specific Antigen Screen   Date Value Ref Range Status   10/02/2023 1.46 0.00 - 3.50 ng/mL Final   12/12/2018 0.8 0.0 - 2.5 ng/mL Final       9/23:  The 10-year ASCVD risk score (Cheryl BAL, et al., 2019) is: 4.3%    Values used to calculate the score:      Age: 53 years      Sex: Male      Is Non- : No      Diabetic: No      Tobacco smoker: No      Systolic Blood Pressure: 134 mmHg      Is BP treated: No      HDL Cholesterol: 82 mg/dL      Total Cholesterol: 238 mg/dL      Immunization History   Administered  Date(s) Administered    COVID-19 MONOVALENT 12+ (Pfizer) 04/26/2021, 05/17/2021    DT (PEDS <7y) 08/04/2005    Flu, Unspecified 09/27/2011, 09/18/2019    Hepatitis A (ADULT 19+) 10/12/2010, 04/05/2012    Influenza (IIV3) PF 10/13/2010    Influenza (prior to 2024) 10/21/2009    Influenza Vaccine 18-64 (Flublok) 10/23/2020, 10/02/2023    Influenza Vaccine, 6+MO IM (QUADRIVALENT W/PRESERVATIVES) 12/12/2018    MMR 08/31/1994    TD,PF 7+ (Tenivac) 08/04/2005    TDAP (Adacel,Boostrix) 12/18/2013    Td (Adult), Adsorbed 08/31/1994    Td,adult,historic,unspecified 08/04/2005     Immunization status: due today.     Current Outpatient Medications   Medication Sig Dispense Refill    citalopram (CELEXA) 10 MG tablet TAKE 1 TABLET BY MOUTH DAILY 90 tablet 0    ibuprofen (ADVIL/MOTRIN) 600 MG tablet Take 1 tablet (600 mg) by mouth every 6 hours as needed for other (mild and/or inflammatory pain) 30 tablet 0    HYDROcodone-acetaminophen (NORCO) 5-325 MG tablet Take 1-2 tablets by mouth every 4 hours as needed for moderate to severe pain 20 tablet 0     No past medical history on file.  Past Surgical History:   Procedure Laterality Date    SEPTOPLASTY N/A 7/10/2024    Procedure: SEPTOPLASTY, NOSE;  Surgeon: Remi Boo MD;  Location: [facility] OR    TURBINATE REDUCTION Bilateral 7/10/2024    Procedure: REDUCTION, INFERIOR NASAL TURBINATE;  Surgeon: Remi Boo MD;  Location: [facility] OR     Seasonal allergies  No family history on file.  Social History     Socioeconomic History    Marital status: Single     Spouse name: Not on file    Number of children: Not on file    Years of education: Not on file    Highest education level: Not on file   Occupational History    Not on file   Tobacco Use    Smoking status: Never    Smokeless tobacco: Never    Tobacco comments:     Partner smokes outside   Substance and Sexual Activity    Alcohol use: Yes     Comment: Alcoholic Drinks/day: 20drinks/wk    Drug use: No    Sexual  activity: Yes     Partners: Female     Birth control/protection: Post-menopausal   Other Topics Concern    Not on file   Social History Narrative    Not on file     Social Drivers of Health     Financial Resource Strain: Low Risk  (12/29/2024)    Financial Resource Strain     Within the past 12 months, have you or your family members you live with been unable to get utilities (heat, electricity) when it was really needed?: No   Food Insecurity: Low Risk  (12/29/2024)    Food Insecurity     Within the past 12 months, did you worry that your food would run out before you got money to buy more?: No     Within the past 12 months, did the food you bought just not last and you didn t have money to get more?: No   Transportation Needs: Low Risk  (12/29/2024)    Transportation Needs     Within the past 12 months, has lack of transportation kept you from medical appointments, getting your medicines, non-medical meetings or appointments, work, or from getting things that you need?: No   Physical Activity: Sufficiently Active (12/29/2024)    Exercise Vital Sign     Days of Exercise per Week: 5 days     Minutes of Exercise per Session: 30 min   Stress: No Stress Concern Present (12/29/2024)    [employer] [place] [employer] - Occupational Stress Questionnaire     Feeling of Stress : Only a little   Social Connections: Unknown (12/29/2024)    Social Connection and Isolation Panel [NHANES]     Frequency of Communication with Friends and Family: Not on file     Frequency of Social Gatherings with Friends and Family: Twice a week     Attends [employer] Services: Not on file     Active Member of Clubs or Organizations: Not on file     Attends Club or Organization Meetings: Not on file     Marital Status: Not on file   Interpersonal Safety: Low Risk  (12/31/2024)    Interpersonal Safety     Do you feel physically and emotionally safe where you currently live?: Yes     Within the past 12 months, have you been hit, slapped,  "kicked or otherwise physically hurt by someone?: No     Within the past 12 months, have you been humiliated or emotionally abused in other ways by your partner or ex-partner?: No   Housing Stability: Low Risk  (12/29/2024)    Housing Stability     Do you have housing? : Yes     Are you worried about losing your housing?: No       Review of Systems  Complete ROS negative except as noted in the HPI    Objective:      Vitals:    12/31/24 0917   BP: 137/83   Pulse: 91   Resp: 17   Temp: 97.1  F (36.2  C)   SpO2: 98%   Weight: 75.5 kg (166 lb 7 oz)   Height: 1.728 m (5' 8.04\")       General appearance: Alert, cooperative, no distress, appears stated age  Head: Normocephalic, atraumatic, without obvious abnormality  EARS: TM's gray dull with structures seen bilaterally  Eyes: Pupils equal round, reactive.  Conjunctiva clear.  Nose: Nares normal, no drainage.  Throat: Lips, mucosa, tongue normal mucosa pink and moist  Neck: Supple, symmetric, trachea midline, no adenopathy.  No thyroid enlargement, tenderness or nodules.    Lungs: Clear to auscultation bilaterally, no wheezing or crackles present.  Respirations unlabored  Heart: Regular rate and rhythm, normal S1 and S2, no murmur, rub or gallop.  Abdomen: Soft, nontender, nondistended.  Bowel sounds active in all 4 quadrants.  No masses or organomegaly.  Extremities: Extremities normal, atraumatic.  No cyanosis or edema.  Skin: Skin color, texture, turgor normal no rashes or lesions on limited skin exam  Neurologic: CN II through XII intact, normal strength.      Driss Johnson MD    "

## 2025-04-24 DIAGNOSIS — F41.1 GAD (GENERALIZED ANXIETY DISORDER): ICD-10-CM

## 2025-04-24 RX ORDER — CITALOPRAM HYDROBROMIDE 10 MG/1
10 TABLET ORAL DAILY
Qty: 90 TABLET | Refills: 3 | Status: SHIPPED | OUTPATIENT
Start: 2025-04-24

## 2025-04-24 NOTE — TELEPHONE ENCOUNTER
Medication Question or Refill--PHARMACY CHANGE    Contacts       Contact Date/Time Type Contact Phone/Fax    04/24/2025 10:43 AM CDT Phone (Incoming) Red Dunbar (Self) [phone] (M)            What medication are you calling about (include dose and sig)?: citalopram    Preferred Pharmacy:   [facility] [address]  [address]  [facility] [address]  [facility] [address]  Phone: [phone] Fax: [phone]      Controlled Substance Agreement on file:   CSA -- Patient Level:    CSA: None found at the patient level.       Who prescribed the medication?: Johnson    Do you need a refill? Yes    When did you use the medication last? daily    Patient offered an appointment? No    Do you have any questions or concerns?  Yes: has only 7 left-- pharmacy change to [facility]     Please call when sent, so he can confirm with them and does not have to go without medication       Could we send this information to you in [facility] or would you prefer to receive a phone call?:   Patient would prefer a phone call   Okay to leave a detailed message?: Yes at Cell number on file:    Telephone Information:   Mobile [phone]

## (undated) DEVICE — SOL NACL 0.9% IRRIG 500ML BOTTLE 2F7123

## (undated) DEVICE — GLOVE BIOGEL PI MICRO SZ 7.5 48575

## (undated) DEVICE — SUCTION MANIFOLD NEPTUNE 2 SYS 1 PORT 702-025-000

## (undated) DEVICE — NDL 25GA 2"  8881200441

## (undated) DEVICE — PACK ENT ENDOSCOPY CUSTOM ASC

## (undated) DEVICE — SPONGE COTTONOID 1/2X3" 20-07S

## (undated) DEVICE — NDL COUNTER 20CT 31142493

## (undated) DEVICE — BLADE TURBINATE INFERIOR 2MM ROTATE  1882040HR

## (undated) DEVICE — SPLINT NASAL DOYLE BREATHEASY 20-10500

## (undated) DEVICE — DRSG HOLDER NASAL DALE 600

## (undated) DEVICE — SPONGE COTTONOID 2X1/2" 80-1406

## (undated) DEVICE — SYR 03ML LL W/O NDL 309657

## (undated) DEVICE — SU CHROMIC 4-0 PS-2 18" 1637G

## (undated) DEVICE — BLADE KNIFE SURG 15 371115

## (undated) DEVICE — LINEN TOWEL PACK X5 5464

## (undated) DEVICE — TUBING SUCTION 10'X3/16" N510

## (undated) DEVICE — ESU GROUND PAD ADULT W/CORD E7507

## (undated) DEVICE — DRAPE U SPLIT 74X120" 29440

## (undated) DEVICE — ESU ELEC NDL 1" COATED/INSULATED E1465

## (undated) DEVICE — ESU ELEC BLADE 2.75" COATED/INSULATED E1455

## (undated) DEVICE — DRSG GAUZE 4X4" TRAY 6939

## (undated) DEVICE — SOL NACL 0.9% INJ 1000ML BAG 2B1324X

## (undated) DEVICE — SU PDS II 5-0 RB-2DA 30" Z148H

## (undated) DEVICE — SUCTION MANIFOLD NEPTUNE 2 SYS 4 PORT 0702-020-000

## (undated) DEVICE — DRSG STERI STRIP 1X5" R1548

## (undated) DEVICE — PACK ENT MINOR CUSTOM ASC

## (undated) RX ORDER — ONDANSETRON 2 MG/ML
INJECTION INTRAMUSCULAR; INTRAVENOUS
Status: DISPENSED
Start: 2024-07-10

## (undated) RX ORDER — FENTANYL CITRATE-0.9 % NACL/PF 10 MCG/ML
PLASTIC BAG, INJECTION (ML) INTRAVENOUS
Status: DISPENSED
Start: 2024-07-10

## (undated) RX ORDER — HYDROMORPHONE HYDROCHLORIDE 1 MG/ML
INJECTION, SOLUTION INTRAMUSCULAR; INTRAVENOUS; SUBCUTANEOUS
Status: DISPENSED
Start: 2024-07-10

## (undated) RX ORDER — ACETAMINOPHEN 325 MG/1
TABLET ORAL
Status: DISPENSED
Start: 2024-07-10

## (undated) RX ORDER — PROPOFOL 10 MG/ML
INJECTION, EMULSION INTRAVENOUS
Status: DISPENSED
Start: 2024-07-10

## (undated) RX ORDER — CEFAZOLIN SODIUM 2 G/50ML
SOLUTION INTRAVENOUS
Status: DISPENSED
Start: 2024-07-10

## (undated) RX ORDER — DEXAMETHASONE SODIUM PHOSPHATE 10 MG/ML
INJECTION, SOLUTION INTRAMUSCULAR; INTRAVENOUS
Status: DISPENSED
Start: 2024-07-10

## (undated) RX ORDER — FENTANYL CITRATE 50 UG/ML
INJECTION, SOLUTION INTRAMUSCULAR; INTRAVENOUS
Status: DISPENSED
Start: 2024-07-10